# Patient Record
Sex: FEMALE | Race: WHITE | Employment: UNEMPLOYED
[De-identification: names, ages, dates, MRNs, and addresses within clinical notes are randomized per-mention and may not be internally consistent; named-entity substitution may affect disease eponyms.]

---

## 2017-01-23 ENCOUNTER — CARE COORDINATION (OUTPATIENT)
Dept: CARE COORDINATION | Facility: CLINIC | Age: 42
End: 2017-01-23

## 2017-02-04 ENCOUNTER — TELEPHONE (OUTPATIENT)
Dept: FAMILY MEDICINE CLINIC | Facility: CLINIC | Age: 42
End: 2017-02-04

## 2017-02-06 ENCOUNTER — CARE COORDINATION (OUTPATIENT)
Dept: CARE COORDINATION | Facility: CLINIC | Age: 42
End: 2017-02-06

## 2017-02-13 ENCOUNTER — CARE COORDINATION (OUTPATIENT)
Dept: CARE COORDINATION | Facility: CLINIC | Age: 42
End: 2017-02-13

## 2017-02-20 ENCOUNTER — CARE COORDINATION (OUTPATIENT)
Dept: CARE COORDINATION | Facility: CLINIC | Age: 42
End: 2017-02-20

## 2017-02-27 ENCOUNTER — CARE COORDINATION (OUTPATIENT)
Dept: CARE COORDINATION | Facility: CLINIC | Age: 42
End: 2017-02-27

## 2017-02-28 ENCOUNTER — CARE COORDINATION (OUTPATIENT)
Dept: CARE COORDINATION | Facility: CLINIC | Age: 42
End: 2017-02-28

## 2017-03-20 ENCOUNTER — CARE COORDINATION (OUTPATIENT)
Dept: CARE COORDINATION | Age: 42
End: 2017-03-20

## 2017-03-27 ENCOUNTER — CARE COORDINATION (OUTPATIENT)
Dept: CARE COORDINATION | Age: 42
End: 2017-03-27

## 2017-04-05 DIAGNOSIS — E03.9 HYPOTHYROIDISM, UNSPECIFIED TYPE: Primary | ICD-10-CM

## 2017-04-06 RX ORDER — LEVOTHYROXINE SODIUM 0.07 MG/1
75 TABLET ORAL DAILY
Qty: 30 TABLET | Refills: 1 | Status: SHIPPED | OUTPATIENT
Start: 2017-04-06 | End: 2017-06-18 | Stop reason: SDUPTHER

## 2017-04-10 ENCOUNTER — CARE COORDINATION (OUTPATIENT)
Dept: CARE COORDINATION | Age: 42
End: 2017-04-10

## 2017-04-17 ENCOUNTER — CARE COORDINATION (OUTPATIENT)
Dept: CARE COORDINATION | Age: 42
End: 2017-04-17

## 2017-05-01 ENCOUNTER — CARE COORDINATION (OUTPATIENT)
Dept: CARE COORDINATION | Age: 42
End: 2017-05-01

## 2017-05-11 DIAGNOSIS — F33.1 MODERATE EPISODE OF RECURRENT MAJOR DEPRESSIVE DISORDER (HCC): ICD-10-CM

## 2017-05-11 RX ORDER — MIRTAZAPINE 30 MG/1
30 TABLET, FILM COATED ORAL NIGHTLY
Qty: 30 TABLET | Refills: 2 | Status: SHIPPED | OUTPATIENT
Start: 2017-05-11 | End: 2017-08-09

## 2017-05-11 RX ORDER — ATORVASTATIN CALCIUM 20 MG/1
20 TABLET, FILM COATED ORAL DAILY
Qty: 30 TABLET | Refills: 2 | Status: SHIPPED | OUTPATIENT
Start: 2017-05-11 | End: 2017-08-30 | Stop reason: SDUPTHER

## 2017-05-30 ENCOUNTER — OFFICE VISIT (OUTPATIENT)
Dept: FAMILY MEDICINE CLINIC | Age: 42
End: 2017-05-30
Payer: MEDICARE

## 2017-05-30 ENCOUNTER — HOSPITAL ENCOUNTER (OUTPATIENT)
Age: 42
Setting detail: SPECIMEN
Discharge: HOME | End: 2017-05-30
Payer: MEDICARE

## 2017-05-30 VITALS
WEIGHT: 130 LBS | BODY MASS INDEX: 23.04 KG/M2 | TEMPERATURE: 97.8 F | SYSTOLIC BLOOD PRESSURE: 98 MMHG | RESPIRATION RATE: 20 BRPM | HEART RATE: 100 BPM | HEIGHT: 63 IN | DIASTOLIC BLOOD PRESSURE: 70 MMHG

## 2017-05-30 DIAGNOSIS — E03.9 HYPOTHYROIDISM, UNSPECIFIED TYPE: ICD-10-CM

## 2017-05-30 DIAGNOSIS — K76.0 FATTY LIVER: ICD-10-CM

## 2017-05-30 DIAGNOSIS — Z23 NEED FOR PNEUMOCOCCAL VACCINATION: ICD-10-CM

## 2017-05-30 DIAGNOSIS — M21.611 BILATERAL BUNIONS: ICD-10-CM

## 2017-05-30 DIAGNOSIS — M21.612 BILATERAL BUNIONS: ICD-10-CM

## 2017-05-30 DIAGNOSIS — G62.9 NEUROPATHY: ICD-10-CM

## 2017-05-30 DIAGNOSIS — I95.89 OTHER SPECIFIED HYPOTENSION: ICD-10-CM

## 2017-05-30 DIAGNOSIS — K70.31 ALCOHOLIC CIRRHOSIS OF LIVER WITH ASCITES (HCC): ICD-10-CM

## 2017-05-30 DIAGNOSIS — K76.82 HEPATIC ENCEPHALOPATHY: ICD-10-CM

## 2017-05-30 DIAGNOSIS — F33.0 MILD EPISODE OF RECURRENT MAJOR DEPRESSIVE DISORDER (HCC): ICD-10-CM

## 2017-05-30 LAB
ANION GAP SERPL CALCULATED.3IONS-SCNC: 13 MMOL/L (ref 9–17)
BUN BLDV-MCNC: 25 MG/DL (ref 6–20)
BUN/CREAT BLD: ABNORMAL (ref 9–20)
CALCIUM SERPL-MCNC: 9.2 MG/DL (ref 8.6–10.4)
CHLORIDE BLD-SCNC: 99 MMOL/L (ref 98–107)
CO2: 24 MMOL/L (ref 20–31)
CREAT SERPL-MCNC: 1.96 MG/DL (ref 0.5–0.9)
CREATININE URINE: 73.4 MG/DL (ref 28–217)
GFR AFRICAN AMERICAN: 34 ML/MIN
GFR NON-AFRICAN AMERICAN: 28 ML/MIN
GFR SERPL CREATININE-BSD FRML MDRD: ABNORMAL ML/MIN/{1.73_M2}
GFR SERPL CREATININE-BSD FRML MDRD: ABNORMAL ML/MIN/{1.73_M2}
GLUCOSE BLD-MCNC: 123 MG/DL (ref 70–99)
MICROALBUMIN/CREAT 24H UR: <12 MG/L
MICROALBUMIN/CREAT UR-RTO: 16 MCG/MG CREAT
POTASSIUM SERPL-SCNC: 3.9 MMOL/L (ref 3.7–5.3)
SODIUM BLD-SCNC: 136 MMOL/L (ref 135–144)
TSH SERPL DL<=0.05 MIU/L-ACNC: 0.86 MIU/L (ref 0.3–5)

## 2017-05-30 PROCEDURE — G0009 ADMIN PNEUMOCOCCAL VACCINE: HCPCS | Performed by: NURSE PRACTITIONER

## 2017-05-30 PROCEDURE — 99214 OFFICE O/P EST MOD 30 MIN: CPT | Performed by: NURSE PRACTITIONER

## 2017-05-30 PROCEDURE — 90670 PCV13 VACCINE IM: CPT | Performed by: NURSE PRACTITIONER

## 2017-05-30 ASSESSMENT — ENCOUNTER SYMPTOMS
RHINORRHEA: 1
WHEEZING: 0
CONSTIPATION: 0
COUGH: 0
NAUSEA: 1
SHORTNESS OF BREATH: 0
TROUBLE SWALLOWING: 0
SORE THROAT: 0
DIARRHEA: 0
EYE DISCHARGE: 0
EYE PAIN: 0
VOMITING: 1
ABDOMINAL PAIN: 0

## 2017-05-30 ASSESSMENT — PATIENT HEALTH QUESTIONNAIRE - PHQ9
SUM OF ALL RESPONSES TO PHQ QUESTIONS 1-9: 0
1. LITTLE INTEREST OR PLEASURE IN DOING THINGS: 0
2. FEELING DOWN, DEPRESSED OR HOPELESS: 0
SUM OF ALL RESPONSES TO PHQ9 QUESTIONS 1 & 2: 0

## 2017-05-31 LAB
ESTIMATED AVERAGE GLUCOSE: 180 MG/DL
HBA1C MFR BLD: 7.9 % (ref 4–6)

## 2017-06-18 DIAGNOSIS — E03.9 HYPOTHYROIDISM, UNSPECIFIED TYPE: ICD-10-CM

## 2017-06-19 ENCOUNTER — TELEPHONE (OUTPATIENT)
Dept: FAMILY MEDICINE CLINIC | Age: 42
End: 2017-06-19

## 2017-06-19 DIAGNOSIS — R79.89 ELEVATED SERUM CREATININE: ICD-10-CM

## 2017-06-19 DIAGNOSIS — R79.9 ELEVATED BUN: Primary | ICD-10-CM

## 2017-06-19 RX ORDER — MIDODRINE HYDROCHLORIDE 5 MG/1
TABLET ORAL
Qty: 240 TABLET | Refills: 1 | Status: SHIPPED | OUTPATIENT
Start: 2017-06-19 | End: 2017-08-09 | Stop reason: SDUPTHER

## 2017-06-19 RX ORDER — LEVOTHYROXINE SODIUM 0.07 MG/1
75 TABLET ORAL DAILY
Qty: 30 TABLET | Refills: 9 | Status: SHIPPED | OUTPATIENT
Start: 2017-06-19 | End: 2018-04-23 | Stop reason: DRUGHIGH

## 2017-08-09 ENCOUNTER — CARE COORDINATION (OUTPATIENT)
Dept: CARE COORDINATION | Age: 42
End: 2017-08-09

## 2017-08-09 RX ORDER — TRAZODONE HYDROCHLORIDE 100 MG/1
100 TABLET ORAL NIGHTLY
Qty: 30 TABLET | Refills: 2 | OUTPATIENT
Start: 2017-08-09 | End: 2018-04-23

## 2017-08-16 ENCOUNTER — CARE COORDINATION (OUTPATIENT)
Dept: CARE COORDINATION | Age: 42
End: 2017-08-16

## 2017-08-24 ENCOUNTER — OFFICE VISIT (OUTPATIENT)
Dept: FAMILY MEDICINE CLINIC | Age: 42
End: 2017-08-24
Payer: MEDICARE

## 2017-08-24 VITALS
OXYGEN SATURATION: 99 % | HEART RATE: 66 BPM | BODY MASS INDEX: 22.15 KG/M2 | SYSTOLIC BLOOD PRESSURE: 112 MMHG | TEMPERATURE: 97 F | HEIGHT: 63 IN | WEIGHT: 125 LBS | RESPIRATION RATE: 20 BRPM | DIASTOLIC BLOOD PRESSURE: 82 MMHG

## 2017-08-24 DIAGNOSIS — K70.31 ALCOHOLIC CIRRHOSIS OF LIVER WITH ASCITES (HCC): ICD-10-CM

## 2017-08-24 DIAGNOSIS — E11.319 DIABETIC RETINOPATHY WITHOUT MACULAR EDEMA ASSOCIATED WITH TYPE 2 DIABETES MELLITUS, UNSPECIFIED LATERALITY, UNSPECIFIED RETINOPATHY SEVERITY (HCC): ICD-10-CM

## 2017-08-24 DIAGNOSIS — E11.42 DIABETIC POLYNEUROPATHY ASSOCIATED WITH TYPE 2 DIABETES MELLITUS (HCC): ICD-10-CM

## 2017-08-24 DIAGNOSIS — E11.21 DIABETIC NEPHROPATHY ASSOCIATED WITH TYPE 2 DIABETES MELLITUS (HCC): ICD-10-CM

## 2017-08-24 DIAGNOSIS — K76.82 HEPATIC ENCEPHALOPATHY: ICD-10-CM

## 2017-08-24 DIAGNOSIS — E03.9 HYPOTHYROIDISM, UNSPECIFIED TYPE: ICD-10-CM

## 2017-08-24 DIAGNOSIS — K86.0 ALCOHOL-INDUCED CHRONIC PANCREATITIS (HCC): ICD-10-CM

## 2017-08-24 DIAGNOSIS — I95.89 OTHER SPECIFIED HYPOTENSION: ICD-10-CM

## 2017-08-24 PROCEDURE — 99214 OFFICE O/P EST MOD 30 MIN: CPT | Performed by: NURSE PRACTITIONER

## 2017-08-24 ASSESSMENT — ENCOUNTER SYMPTOMS
RHINORRHEA: 0
NAUSEA: 1
EYE DISCHARGE: 0
CONSTIPATION: 0
WHEEZING: 0
SORE THROAT: 0
TROUBLE SWALLOWING: 0
SHORTNESS OF BREATH: 0
COUGH: 0
BLOOD IN STOOL: 1
BACK PAIN: 0
DIARRHEA: 0
EYE PAIN: 0
ABDOMINAL PAIN: 0
VOMITING: 1

## 2017-08-30 ENCOUNTER — CARE COORDINATION (OUTPATIENT)
Dept: CARE COORDINATION | Age: 42
End: 2017-08-30

## 2017-08-30 RX ORDER — ATORVASTATIN CALCIUM 20 MG/1
20 TABLET, FILM COATED ORAL DAILY
Qty: 30 TABLET | Refills: 2 | Status: SHIPPED | OUTPATIENT
Start: 2017-08-30 | End: 2017-10-13 | Stop reason: SDUPTHER

## 2017-09-06 ENCOUNTER — CARE COORDINATION (OUTPATIENT)
Dept: CARE COORDINATION | Age: 42
End: 2017-09-06

## 2017-09-13 ENCOUNTER — CARE COORDINATION (OUTPATIENT)
Dept: CARE COORDINATION | Age: 42
End: 2017-09-13

## 2017-09-20 ENCOUNTER — CARE COORDINATION (OUTPATIENT)
Dept: CARE COORDINATION | Age: 42
End: 2017-09-20

## 2017-09-27 ENCOUNTER — CARE COORDINATION (OUTPATIENT)
Dept: CARE COORDINATION | Age: 42
End: 2017-09-27

## 2017-10-11 ENCOUNTER — CARE COORDINATION (OUTPATIENT)
Dept: CARE COORDINATION | Age: 42
End: 2017-10-11

## 2017-10-13 RX ORDER — ATORVASTATIN CALCIUM 20 MG/1
20 TABLET, FILM COATED ORAL DAILY
Qty: 90 TABLET | Refills: 0 | Status: SHIPPED | OUTPATIENT
Start: 2017-10-13 | End: 2018-03-16 | Stop reason: SDUPTHER

## 2017-10-13 NOTE — TELEPHONE ENCOUNTER
Uncontrolled type 2 diabetes mellitus with stage 4 chronic kidney disease, with long-term current use of insulin ([facility] [address]

## 2017-10-18 ENCOUNTER — CARE COORDINATION (OUTPATIENT)
Dept: CARE COORDINATION | Age: 42
End: 2017-10-18

## 2017-10-25 ENCOUNTER — CARE COORDINATION (OUTPATIENT)
Dept: CARE COORDINATION | Age: 42
End: 2017-10-25

## 2017-11-01 ENCOUNTER — CARE COORDINATION (OUTPATIENT)
Dept: CARE COORDINATION | Age: 42
End: 2017-11-01

## 2017-11-07 RX ORDER — PEN NEEDLE, DIABETIC 31 GX5/16"
1 NEEDLE, DISPOSABLE MISCELLANEOUS DAILY
Qty: 100 EACH | Refills: 2 | Status: SHIPPED | OUTPATIENT
Start: 2017-11-07 | End: 2019-01-22

## 2017-11-08 ENCOUNTER — CARE COORDINATION (OUTPATIENT)
Dept: CARE COORDINATION | Age: 42
End: 2017-11-08

## 2017-11-08 NOTE — CARE COORDINATION
Ambulatory Care Coordination Note  11/8/2017  CM Risk Score: No Risk Score On File  Brenda Mortality Risk Score:      ACC: Colonel Botello, RN    Summary Note: spoke with pt who said she is doing well. She said her bs have been great 110-115. She is taking 5 units of insulin and this going good. She denies any lows. She did follow up with nephrology. Will discharge from care coordination at this time. She does know how to reach rncc if she has any questions or difficulty with her bs. Care Coordination Interventions    Program Enrollment:  Rising Risk  Referral from Primary Care Provider:  No  Suggested Interventions and Community Resources  Diabetes Education:  Completed  Medi Set or Pill Pack:  Completed         Goals Addressed     None          Prior to Admission medications    Medication Sig Start Date End Date Taking? Authorizing Provider   Insulin Pen Needle (PEN NEEDLES 31GX5/16\") 31G X 8 MM MISC Inject 1 each as directed daily 11/7/17 11/7/18  Bandar Underwood CNP   atorvastatin (LIPITOR) 20 MG tablet Take 1 tablet by mouth daily 10/13/17 10/13/18  Bandar Underwood CNP   saxagliptin (ONGLYZA) 5 MG TABS tablet Take 1 tablet by mouth daily 8/30/17 8/30/18  Bandar Underwood CNP   glucose blood VI test strips (ASCENSIA AUTODISC VI;ONE TOUCH ULTRA TEST VI) strip 1 each by In Vitro route 4 times daily As needed.  8/25/17 8/25/18  Bandar Underwood CNP   traZODone (DESYREL) 100 MG tablet Take 1 tablet by mouth nightly 8/9/17 8/9/18  Bandar Underwood CNP   levothyroxine (SYNTHROID) 75 MCG tablet Take 1 tablet by mouth daily 6/19/17 6/19/18  Bandar Underwood CNP   insulin glargine (TOUJEO SOLOSTAR) 300 UNIT/ML injection pen Inject 40 units subcutaneously every morning  Patient taking differently: 5 Units nightly Taking 5 units daily 5/11/17 5/11/18 [address], CNP   Blood Glucose Monitoring Suppl CASSY Check blood sugars 2-3 times daily as needed 4/11/17 4/11/18  Bandar Underwood CNP   glucose blood VI test strips (FREESTYLE LITE) strip 1 each by Other route 3 times daily 2/6/17 2/6/18  Claire Worthington CNP   midodrine (PROAMATINE) 5 MG tablet Take 10 mg by mouth daily    Historical Provider, MD VELASCOYLE LANCETS MISC 1 each by Does not apply route daily 12/7/15   Claire Worthington CNP   Blood Gluc Meter Disp-Strips (BLOOD GLUCOSE METER DISPOSABLE) CASSY 1 each by Does not apply route 3 times daily Uses freestyle light glucometer 12/7/15 2/23/16  Claire Worthington CNP   Multiple Vitamins-Minerals (MULTIVITAMIN PO) Take  by mouth.     Historical Provider, MD       Future Appointments  Date Time Provider Eva Smyth   11/13/2017 12:10 PM Aguila Newman MD [facility] None

## 2018-02-07 NOTE — TELEPHONE ENCOUNTER
Neuropathy (HCC)     Tic disorder     Pancreas cyst     Hypothyroidism     Gastroparesis     Chronic pancreatitis (HCC)     Alcoholic cirrhosis of liver with ascites (HCC)     Hepatic encephalopathy (HCC)     Depression     Hypotension     Uncontrolled type 2 diabetes mellitus with stage 4 chronic kidney disease, with long-term current use of insulin ([facility] [address]

## 2018-03-16 NOTE — TELEPHONE ENCOUNTER
LOV 8-24-17  LR 10-13-17  F/U scheduled 5-1-18    Health Maintenance   Topic Date Due    Diabetic retinal exam  02/15/1983    Cervical cancer screen  02/15/1994    Flu vaccine (1) 09/01/2017    DTaP/Tdap/Td vaccine (1 - Tdap) 08/03/2018 (Originally 2/15/1992)    Diabetic foot exam  05/30/2018    TSH testing  05/30/2018    A1C test (Diabetic or Prediabetic)  11/07/2018    Lipid screen  11/07/2018    Potassium monitoring  11/08/2018    Creatinine monitoring  11/08/2018    Pneumococcal highest risk (3 of 3 - PPSV23) 10/28/2019    HIV screen  Completed             (applicable per patient's age: Cancer Screenings, Depression Screening, Fall Risk Screening, Immunizations)    Hemoglobin A1C (%)   Date Value   11/07/2017 6.8 (H)   05/30/2017 7.9 (H)   12/20/2016 9.7 (H)     Microalb/Crt.  Ratio (mcg/mg creat)   Date Value   05/30/2017 16     LDL Cholesterol (mg/dL)   Date Value   11/07/2017 49     LDL Calculated (mg/dL)   Date Value   07/06/2016 121   07/06/2016 121     AST (IU/L)   Date Value   11/07/2017 13     ALT (IU/L)   Date Value   11/07/2017 10     BUN (mg/dL)   Date Value   11/07/2017 13      (goal A1C is < 7)   (goal LDL is <100) need 30-50% reduction from baseline     BP Readings from Last 3 Encounters:   01/15/18 92/60   10/30/17 92/60   08/24/17 112/82    (goal /80)      All Future Testing planned in [facility]:  Lab Frequency Next Occurrence   US Renal Complete Once 07/04/5926   Basic Metabolic Panel Once 89/17/3611   CBC Auto Differential Once 04/15/2018   PTH, Intact Once 04/15/2018   Vitamin D 25 Hydroxy Once 04/15/2018   Creatinine, Random Urine Once 04/15/2018   Protein, urine, random Once 04/15/2018       Next Visit Date:  Future Appointments  Date Time Provider Eva Smyth   4/23/2018 3:10 PM Demarcus Kearns MD [facility] None   5/1/2018 3:00 PM Mark Eugene CNP [place] Diver [address] [facility]            Patient Active Problem List:     Fatty liver     Migraine without aura     Neuropathy

## 2018-03-19 RX ORDER — ATORVASTATIN CALCIUM 20 MG/1
20 TABLET, FILM COATED ORAL DAILY
Qty: 90 TABLET | Refills: 1 | Status: SHIPPED | OUTPATIENT
Start: 2018-03-19 | End: 2018-10-13 | Stop reason: SDUPTHER

## 2018-03-21 NOTE — TELEPHONE ENCOUNTER
LOV 8-24-17  LR 11-15-17     Health Maintenance   Topic Date Due    Diabetic retinal exam  02/15/1983    Cervical cancer screen  02/15/1994    Flu vaccine (1) 09/01/2017    DTaP/Tdap/Td vaccine (1 - Tdap) 08/03/2018 (Originally 2/15/1992)    Diabetic foot exam  05/30/2018    TSH testing  05/30/2018    A1C test (Diabetic or Prediabetic)  11/07/2018    Lipid screen  11/07/2018    Potassium monitoring  11/08/2018    Creatinine monitoring  11/08/2018    Pneumococcal highest risk (3 of 3 - PPSV23) 10/28/2019    HIV screen  Completed             (applicable per patient's age: Cancer Screenings, Depression Screening, Fall Risk Screening, Immunizations)    Hemoglobin A1C (%)   Date Value   11/07/2017 6.8 (H)   05/30/2017 7.9 (H)   12/20/2016 9.7 (H)     Microalb/Crt.  Ratio (mcg/mg creat)   Date Value   05/30/2017 16     LDL Cholesterol (mg/dL)   Date Value   11/07/2017 49     LDL Calculated (mg/dL)   Date Value   07/06/2016 121   07/06/2016 121     AST (IU/L)   Date Value   11/07/2017 13     ALT (IU/L)   Date Value   11/07/2017 10     BUN (mg/dL)   Date Value   11/07/2017 13      (goal A1C is < 7)   (goal LDL is <100) need 30-50% reduction from baseline     BP Readings from Last 3 Encounters:   01/15/18 92/60   10/30/17 92/60   08/24/17 112/82    (goal /80)      All Future Testing planned in [facility]:  Lab Frequency Next Occurrence   US Renal Complete Once 42/59/9662   Basic Metabolic Panel Once 60/24/0339   CBC Auto Differential Once 04/15/2018   PTH, Intact Once 04/15/2018   Vitamin D 25 Hydroxy Once 04/15/2018   Creatinine, Random Urine Once 04/15/2018   Protein, urine, random Once 04/15/2018       Next Visit Date:  Future Appointments  Date Time Provider Eva Smyth   4/23/2018 3:10 PM Guero Linton MD [facility] None   5/1/2018 3:00 PM CIARA Moore            Patient Active Problem List:     Fatty liver     Migraine without aura     Neuropathy (HCC)     Tic disorder

## 2018-03-22 RX ORDER — MIDODRINE HYDROCHLORIDE 5 MG/1
10 TABLET ORAL DAILY
Qty: 180 TABLET | Refills: 1 | Status: SHIPPED | OUTPATIENT
Start: 2018-03-22 | End: 2018-10-22 | Stop reason: SDUPTHER

## 2018-04-19 ENCOUNTER — TELEPHONE (OUTPATIENT)
Dept: FAMILY MEDICINE CLINIC | Age: 43
End: 2018-04-19

## 2018-04-19 DIAGNOSIS — E03.9 HYPOTHYROIDISM, UNSPECIFIED TYPE: ICD-10-CM

## 2018-04-23 ENCOUNTER — OFFICE VISIT (OUTPATIENT)
Dept: FAMILY MEDICINE CLINIC | Age: 43
End: 2018-04-23
Payer: MEDICARE

## 2018-04-23 VITALS
DIASTOLIC BLOOD PRESSURE: 68 MMHG | HEART RATE: 92 BPM | TEMPERATURE: 97 F | RESPIRATION RATE: 16 BRPM | SYSTOLIC BLOOD PRESSURE: 94 MMHG

## 2018-04-23 DIAGNOSIS — E03.9 HYPOTHYROIDISM, UNSPECIFIED TYPE: ICD-10-CM

## 2018-04-23 DIAGNOSIS — I95.89 OTHER SPECIFIED HYPOTENSION: ICD-10-CM

## 2018-04-23 DIAGNOSIS — B35.1 FUNGAL TOENAIL INFECTION: ICD-10-CM

## 2018-04-23 DIAGNOSIS — K86.0 ALCOHOL-INDUCED CHRONIC PANCREATITIS (HCC): ICD-10-CM

## 2018-04-23 DIAGNOSIS — Z79.4 CONTROLLED TYPE 2 DIABETES MELLITUS WITH STAGE 3 CHRONIC KIDNEY DISEASE, WITH LONG-TERM CURRENT USE OF INSULIN (HCC): Primary | ICD-10-CM

## 2018-04-23 DIAGNOSIS — F17.200 TOBACCO DEPENDENCE: ICD-10-CM

## 2018-04-23 DIAGNOSIS — G62.9 NEUROPATHY: ICD-10-CM

## 2018-04-23 DIAGNOSIS — E55.9 VITAMIN D DEFICIENCY: ICD-10-CM

## 2018-04-23 DIAGNOSIS — F33.0 MILD EPISODE OF RECURRENT MAJOR DEPRESSIVE DISORDER (HCC): ICD-10-CM

## 2018-04-23 DIAGNOSIS — G47.00 INSOMNIA, UNSPECIFIED TYPE: ICD-10-CM

## 2018-04-23 DIAGNOSIS — K70.31 ALCOHOLIC CIRRHOSIS OF LIVER WITH ASCITES (HCC): ICD-10-CM

## 2018-04-23 DIAGNOSIS — N18.30 CONTROLLED TYPE 2 DIABETES MELLITUS WITH STAGE 3 CHRONIC KIDNEY DISEASE, WITH LONG-TERM CURRENT USE OF INSULIN (HCC): Primary | ICD-10-CM

## 2018-04-23 DIAGNOSIS — E11.22 CONTROLLED TYPE 2 DIABETES MELLITUS WITH STAGE 3 CHRONIC KIDNEY DISEASE, WITH LONG-TERM CURRENT USE OF INSULIN (HCC): Primary | ICD-10-CM

## 2018-04-23 PROCEDURE — G8420 CALC BMI NORM PARAMETERS: HCPCS | Performed by: NURSE PRACTITIONER

## 2018-04-23 PROCEDURE — 3044F HG A1C LEVEL LT 7.0%: CPT | Performed by: NURSE PRACTITIONER

## 2018-04-23 PROCEDURE — 2022F DILAT RTA XM EVC RTNOPTHY: CPT | Performed by: NURSE PRACTITIONER

## 2018-04-23 PROCEDURE — 99214 OFFICE O/P EST MOD 30 MIN: CPT | Performed by: NURSE PRACTITIONER

## 2018-04-23 PROCEDURE — G8427 DOCREV CUR MEDS BY ELIG CLIN: HCPCS | Performed by: NURSE PRACTITIONER

## 2018-04-23 PROCEDURE — 4004F PT TOBACCO SCREEN RCVD TLK: CPT | Performed by: NURSE PRACTITIONER

## 2018-04-23 RX ORDER — TRAZODONE HYDROCHLORIDE 150 MG/1
150 TABLET ORAL NIGHTLY
Qty: 30 TABLET | Refills: 3 | Status: SHIPPED | OUTPATIENT
Start: 2018-04-23 | End: 2019-01-22

## 2018-04-23 RX ORDER — LEVOTHYROXINE SODIUM 0.05 MG/1
50 TABLET ORAL DAILY
Qty: 30 TABLET | Refills: 3 | Status: SHIPPED | OUTPATIENT
Start: 2018-04-23 | End: 2018-09-17 | Stop reason: SDUPTHER

## 2018-04-23 RX ORDER — ERGOCALCIFEROL 1.25 MG/1
50000 CAPSULE ORAL WEEKLY
Qty: 4 CAPSULE | Refills: 2 | Status: SHIPPED | OUTPATIENT
Start: 2018-04-23 | End: 2019-01-22 | Stop reason: ALTCHOICE

## 2018-04-23 ASSESSMENT — ENCOUNTER SYMPTOMS
WHEEZING: 0
BACK PAIN: 0
CONSTIPATION: 0
RHINORRHEA: 0
SHORTNESS OF BREATH: 0
ABDOMINAL PAIN: 0
COUGH: 0
EYE PAIN: 0
SORE THROAT: 0
EYE DISCHARGE: 0
NAUSEA: 0
VOMITING: 0
DIARRHEA: 0

## 2018-04-24 PROBLEM — N18.30 CONTROLLED TYPE 2 DIABETES MELLITUS WITH STAGE 3 CHRONIC KIDNEY DISEASE, WITH LONG-TERM CURRENT USE OF INSULIN (HCC): Status: ACTIVE | Noted: 2017-05-31

## 2018-04-24 PROBLEM — E11.22 CONTROLLED TYPE 2 DIABETES MELLITUS WITH STAGE 3 CHRONIC KIDNEY DISEASE, WITH LONG-TERM CURRENT USE OF INSULIN (HCC): Status: ACTIVE | Noted: 2017-05-31

## 2018-04-24 PROBLEM — Z79.4 CONTROLLED TYPE 2 DIABETES MELLITUS WITH STAGE 3 CHRONIC KIDNEY DISEASE, WITH LONG-TERM CURRENT USE OF INSULIN (HCC): Status: ACTIVE | Noted: 2017-05-31

## 2018-06-07 ENCOUNTER — TELEPHONE (OUTPATIENT)
Dept: FAMILY MEDICINE CLINIC | Age: 43
End: 2018-06-07

## 2018-06-26 DIAGNOSIS — E03.9 HYPOTHYROIDISM, UNSPECIFIED TYPE: ICD-10-CM

## 2018-06-26 RX ORDER — LEVOTHYROXINE SODIUM 0.05 MG/1
50 TABLET ORAL DAILY
Qty: 90 TABLET | Refills: 1 | OUTPATIENT
Start: 2018-06-26 | End: 2019-06-26

## 2018-09-17 DIAGNOSIS — E03.9 HYPOTHYROIDISM, UNSPECIFIED TYPE: ICD-10-CM

## 2018-09-17 NOTE — TELEPHONE ENCOUNTER
Migraine without aura     Neuropathy     Tic disorder     Pancreas cyst     Hypothyroidism     Gastroparesis     Chronic pancreatitis (HCC)     Alcoholic cirrhosis of liver with ascites (HCC)     Depression     Hypotension     Controlled type 2 diabetes mellitus with stage 3 chronic kidney disease, with long-term current use of insulin ([facility] [address]

## 2018-09-18 RX ORDER — LEVOTHYROXINE SODIUM 0.05 MG/1
50 TABLET ORAL DAILY
Qty: 30 TABLET | Refills: 0 | Status: SHIPPED | OUTPATIENT
Start: 2018-09-18 | End: 2018-10-22 | Stop reason: SDUPTHER

## 2018-10-15 RX ORDER — ATORVASTATIN CALCIUM 20 MG/1
20 TABLET, FILM COATED ORAL DAILY
Qty: 90 TABLET | Refills: 1 | Status: SHIPPED | OUTPATIENT
Start: 2018-10-15 | End: 2019-01-22 | Stop reason: ALTCHOICE

## 2018-10-22 DIAGNOSIS — E03.9 HYPOTHYROIDISM, UNSPECIFIED TYPE: ICD-10-CM

## 2018-10-26 RX ORDER — LEVOTHYROXINE SODIUM 0.05 MG/1
50 TABLET ORAL DAILY
Qty: 30 TABLET | Refills: 0 | Status: SHIPPED | OUTPATIENT
Start: 2018-10-26 | End: 2019-01-09 | Stop reason: SDUPTHER

## 2018-10-26 RX ORDER — MIDODRINE HYDROCHLORIDE 5 MG/1
10 TABLET ORAL DAILY
Qty: 180 TABLET | Refills: 0 | Status: SHIPPED | OUTPATIENT
Start: 2018-10-26 | End: 2019-01-22 | Stop reason: SDUPTHER

## 2018-10-31 NOTE — TELEPHONE ENCOUNTER
Creatinine Urine Ratio Once 01/10/2019   PTH, Intact Once 01/10/2019   Phosphorus Once 01/10/2019   Vitamin D 25 Hydroxy Once 01/10/2019       Next Visit Date:  Future Appointments  Date Time Provider Eva Libra   11/12/2018 2:20 PM Daniloord TENZIN Quintana - CNP [place]  [address] [facility]   1/21/2019 3:30 PM Leonidas Coyne MD [facility] [place] None            Patient Active Problem List:     Fatty liver     Migraine without aura     Neuropathy     Tic disorder     Pancreas cyst     Hypothyroidism     Gastroparesis     Chronic pancreatitis ([address]     Alcoholic cirrhosis of liver with ascites ([address]     Depression     Hypotension     Controlled type 2 diabetes mellitus with stage 3 chronic kidney disease, with long-term current use of insulin ([address]

## 2018-12-12 ENCOUNTER — TELEPHONE (OUTPATIENT)
Dept: FAMILY MEDICINE CLINIC | Age: 43
End: 2018-12-12

## 2018-12-12 DIAGNOSIS — E11.22 CONTROLLED TYPE 2 DIABETES MELLITUS WITH STAGE 3 CHRONIC KIDNEY DISEASE, WITH LONG-TERM CURRENT USE OF INSULIN (HCC): Primary | ICD-10-CM

## 2018-12-12 DIAGNOSIS — N18.30 CONTROLLED TYPE 2 DIABETES MELLITUS WITH STAGE 3 CHRONIC KIDNEY DISEASE, WITH LONG-TERM CURRENT USE OF INSULIN (HCC): Primary | ICD-10-CM

## 2018-12-12 DIAGNOSIS — K70.31 ALCOHOLIC CIRRHOSIS OF LIVER WITH ASCITES (HCC): ICD-10-CM

## 2018-12-12 DIAGNOSIS — Z79.4 CONTROLLED TYPE 2 DIABETES MELLITUS WITH STAGE 3 CHRONIC KIDNEY DISEASE, WITH LONG-TERM CURRENT USE OF INSULIN (HCC): Primary | ICD-10-CM

## 2018-12-12 DIAGNOSIS — E03.9 HYPOTHYROIDISM, UNSPECIFIED TYPE: ICD-10-CM

## 2018-12-12 LAB
AVERAGE GLUCOSE: NORMAL
HBA1C MFR BLD: 5.7 %

## 2018-12-14 LAB — DIABETIC RETINOPATHY: NEGATIVE

## 2019-01-08 LAB
CREATININE URINE: NORMAL MG/DL
MICROALBUMIN/CREAT 24H UR: 0.25 MG/G{CREAT}

## 2019-01-09 DIAGNOSIS — N18.30 CONTROLLED TYPE 2 DIABETES MELLITUS WITH STAGE 3 CHRONIC KIDNEY DISEASE, WITH LONG-TERM CURRENT USE OF INSULIN (HCC): ICD-10-CM

## 2019-01-09 DIAGNOSIS — E11.22 CONTROLLED TYPE 2 DIABETES MELLITUS WITH STAGE 3 CHRONIC KIDNEY DISEASE, WITH LONG-TERM CURRENT USE OF INSULIN (HCC): ICD-10-CM

## 2019-01-09 DIAGNOSIS — E03.9 HYPOTHYROIDISM, UNSPECIFIED TYPE: ICD-10-CM

## 2019-01-09 DIAGNOSIS — Z79.4 CONTROLLED TYPE 2 DIABETES MELLITUS WITH STAGE 3 CHRONIC KIDNEY DISEASE, WITH LONG-TERM CURRENT USE OF INSULIN (HCC): ICD-10-CM

## 2019-01-14 RX ORDER — LEVOTHYROXINE SODIUM 0.05 MG/1
50 TABLET ORAL DAILY
Qty: 30 TABLET | Refills: 0 | Status: SHIPPED | OUTPATIENT
Start: 2019-01-14 | End: 2019-01-22 | Stop reason: SDUPTHER

## 2019-01-22 ENCOUNTER — OFFICE VISIT (OUTPATIENT)
Dept: FAMILY MEDICINE CLINIC | Age: 44
End: 2019-01-22
Payer: MEDICARE

## 2019-01-22 VITALS
TEMPERATURE: 97.8 F | SYSTOLIC BLOOD PRESSURE: 104 MMHG | WEIGHT: 110 LBS | DIASTOLIC BLOOD PRESSURE: 72 MMHG | BODY MASS INDEX: 19.49 KG/M2 | HEART RATE: 88 BPM | RESPIRATION RATE: 14 BRPM

## 2019-01-22 DIAGNOSIS — N18.30 CONTROLLED TYPE 2 DIABETES MELLITUS WITH STAGE 3 CHRONIC KIDNEY DISEASE, WITH LONG-TERM CURRENT USE OF INSULIN (HCC): Primary | ICD-10-CM

## 2019-01-22 DIAGNOSIS — E03.9 HYPOTHYROIDISM, UNSPECIFIED TYPE: ICD-10-CM

## 2019-01-22 DIAGNOSIS — E11.22 CONTROLLED TYPE 2 DIABETES MELLITUS WITH STAGE 3 CHRONIC KIDNEY DISEASE, WITH LONG-TERM CURRENT USE OF INSULIN (HCC): Primary | ICD-10-CM

## 2019-01-22 DIAGNOSIS — K70.31 ALCOHOLIC CIRRHOSIS OF LIVER WITH ASCITES (HCC): ICD-10-CM

## 2019-01-22 DIAGNOSIS — I95.89 OTHER SPECIFIED HYPOTENSION: ICD-10-CM

## 2019-01-22 DIAGNOSIS — K86.0 ALCOHOL-INDUCED CHRONIC PANCREATITIS (HCC): ICD-10-CM

## 2019-01-22 DIAGNOSIS — Z79.4 CONTROLLED TYPE 2 DIABETES MELLITUS WITH STAGE 3 CHRONIC KIDNEY DISEASE, WITH LONG-TERM CURRENT USE OF INSULIN (HCC): Primary | ICD-10-CM

## 2019-01-22 DIAGNOSIS — G62.9 NEUROPATHY: ICD-10-CM

## 2019-01-22 PROCEDURE — 99214 OFFICE O/P EST MOD 30 MIN: CPT | Performed by: NURSE PRACTITIONER

## 2019-01-22 PROCEDURE — G8484 FLU IMMUNIZE NO ADMIN: HCPCS | Performed by: NURSE PRACTITIONER

## 2019-01-22 PROCEDURE — 3044F HG A1C LEVEL LT 7.0%: CPT | Performed by: NURSE PRACTITIONER

## 2019-01-22 PROCEDURE — G8420 CALC BMI NORM PARAMETERS: HCPCS | Performed by: NURSE PRACTITIONER

## 2019-01-22 PROCEDURE — G8427 DOCREV CUR MEDS BY ELIG CLIN: HCPCS | Performed by: NURSE PRACTITIONER

## 2019-01-22 PROCEDURE — 4004F PT TOBACCO SCREEN RCVD TLK: CPT | Performed by: NURSE PRACTITIONER

## 2019-01-22 PROCEDURE — 2022F DILAT RTA XM EVC RTNOPTHY: CPT | Performed by: NURSE PRACTITIONER

## 2019-01-22 RX ORDER — MIDODRINE HYDROCHLORIDE 5 MG/1
10 TABLET ORAL DAILY
Qty: 180 TABLET | Refills: 1 | Status: SHIPPED | OUTPATIENT
Start: 2019-01-22 | End: 2020-02-17

## 2019-01-22 RX ORDER — LEVOTHYROXINE SODIUM 0.05 MG/1
50 TABLET ORAL DAILY
Qty: 90 TABLET | Refills: 3 | Status: SHIPPED | OUTPATIENT
Start: 2019-01-22 | End: 2020-06-03

## 2019-01-22 ASSESSMENT — ENCOUNTER SYMPTOMS
ABDOMINAL PAIN: 0
WHEEZING: 0
NAUSEA: 1
COUGH: 0
DIARRHEA: 0
SORE THROAT: 0
VOMITING: 0
BACK PAIN: 0
SHORTNESS OF BREATH: 0
EYE DISCHARGE: 0
RHINORRHEA: 0
EYE PAIN: 0
CONSTIPATION: 0

## 2019-01-22 ASSESSMENT — PATIENT HEALTH QUESTIONNAIRE - PHQ9
2. FEELING DOWN, DEPRESSED OR HOPELESS: 0
SUM OF ALL RESPONSES TO PHQ9 QUESTIONS 1 & 2: 0
SUM OF ALL RESPONSES TO PHQ QUESTIONS 1-9: 0
SUM OF ALL RESPONSES TO PHQ QUESTIONS 1-9: 0
1. LITTLE INTEREST OR PLEASURE IN DOING THINGS: 0

## 2020-02-14 ENCOUNTER — HOSPITAL ENCOUNTER (OUTPATIENT)
Age: 45
Discharge: HOME | End: 2020-02-14
Payer: MEDICARE

## 2020-02-14 LAB
-: NORMAL
ABSOLUTE EOS #: 0.32 K/UL (ref 0–0.44)
ABSOLUTE IMMATURE GRANULOCYTE: <0.03 K/UL (ref 0–0.3)
ABSOLUTE LYMPH #: 2.44 K/UL (ref 1.1–3.7)
ABSOLUTE MONO #: 0.46 K/UL (ref 0.1–1.2)
AMORPHOUS: NORMAL
ANION GAP SERPL CALCULATED.3IONS-SCNC: 15 MMOL/L (ref 9–17)
BACTERIA: NORMAL
BASOPHILS # BLD: 1 % (ref 0–2)
BASOPHILS ABSOLUTE: 0.05 K/UL (ref 0–0.2)
BILIRUBIN URINE: NEGATIVE
BUN BLDV-MCNC: 29 MG/DL (ref 6–20)
BUN/CREAT BLD: ABNORMAL (ref 9–20)
CALCIUM SERPL-MCNC: 9.6 MG/DL (ref 8.6–10.4)
CASTS UA: NORMAL /LPF (ref 0–8)
CHLORIDE BLD-SCNC: 91 MMOL/L (ref 98–107)
CO2: 25 MMOL/L (ref 20–31)
COLOR: YELLOW
COMMENT UA: ABNORMAL
CREAT SERPL-MCNC: 1.48 MG/DL (ref 0.5–0.9)
CRYSTALS, UA: NORMAL /HPF
DIFFERENTIAL TYPE: ABNORMAL
EOSINOPHILS RELATIVE PERCENT: 5 % (ref 1–4)
EPITHELIAL CELLS UA: NORMAL /HPF (ref 0–5)
ESTIMATED AVERAGE GLUCOSE: 143 MG/DL
GFR AFRICAN AMERICAN: 46 ML/MIN
GFR NON-AFRICAN AMERICAN: 38 ML/MIN
GFR SERPL CREATININE-BSD FRML MDRD: ABNORMAL ML/MIN/{1.73_M2}
GFR SERPL CREATININE-BSD FRML MDRD: ABNORMAL ML/MIN/{1.73_M2}
GLUCOSE BLD-MCNC: 103 MG/DL (ref 70–99)
GLUCOSE URINE: NEGATIVE
HBA1C MFR BLD: 6.6 % (ref 4–6)
HCT VFR BLD CALC: 36.7 % (ref 36.3–47.1)
HEMOGLOBIN: 11.1 G/DL (ref 11.9–15.1)
IMMATURE GRANULOCYTES: 0 %
INR BLD: 1
KETONES, URINE: NEGATIVE
LEUKOCYTE ESTERASE, URINE: ABNORMAL
LYMPHOCYTES # BLD: 40 % (ref 24–43)
MCH RBC QN AUTO: 28.2 PG (ref 25.2–33.5)
MCHC RBC AUTO-ENTMCNC: 30.2 G/DL (ref 28.4–34.8)
MCV RBC AUTO: 93.4 FL (ref 82.6–102.9)
MONOCYTES # BLD: 8 % (ref 3–12)
MRSA, DNA, NASAL: NORMAL
MUCUS: NORMAL
NITRITE, URINE: NEGATIVE
NRBC AUTOMATED: 0 PER 100 WBC
OTHER OBSERVATIONS UA: NORMAL
PARTIAL THROMBOPLASTIN TIME: 27.2 SEC (ref 23–31)
PDW BLD-RTO: 14.1 % (ref 11.8–14.4)
PH UA: 6 (ref 5–8)
PLATELET # BLD: 130 K/UL (ref 138–453)
PLATELET ESTIMATE: ABNORMAL
PMV BLD AUTO: 12.5 FL (ref 8.1–13.5)
POTASSIUM SERPL-SCNC: 3.5 MMOL/L (ref 3.7–5.3)
PROTEIN UA: NEGATIVE
PROTHROMBIN TIME: 10.1 SEC (ref 9.7–11.6)
RBC # BLD: 3.93 M/UL (ref 3.95–5.11)
RBC # BLD: ABNORMAL 10*6/UL
RBC UA: NORMAL /HPF (ref 0–4)
RENAL EPITHELIAL, UA: NORMAL /HPF
SEG NEUTROPHILS: 46 % (ref 36–65)
SEGMENTED NEUTROPHILS ABSOLUTE COUNT: 2.75 K/UL (ref 1.5–8.1)
SODIUM BLD-SCNC: 131 MMOL/L (ref 135–144)
SPECIFIC GRAVITY UA: 1.01 (ref 1–1.03)
SPECIMEN DESCRIPTION: NORMAL
TRICHOMONAS: NORMAL
TURBIDITY: CLEAR
URINE HGB: NEGATIVE
UROBILINOGEN, URINE: NORMAL
WBC # BLD: 6 K/UL (ref 3.5–11.3)
WBC # BLD: ABNORMAL 10*3/UL
WBC UA: NORMAL /HPF (ref 0–5)
YEAST: NORMAL

## 2020-02-14 PROCEDURE — 81001 URINALYSIS AUTO W/SCOPE: CPT

## 2020-02-14 PROCEDURE — 83036 HEMOGLOBIN GLYCOSYLATED A1C: CPT

## 2020-02-14 PROCEDURE — 80048 BASIC METABOLIC PNL TOTAL CA: CPT

## 2020-02-14 PROCEDURE — 36415 COLL VENOUS BLD VENIPUNCTURE: CPT

## 2020-02-14 PROCEDURE — 85025 COMPLETE CBC W/AUTO DIFF WBC: CPT

## 2020-02-14 PROCEDURE — 85610 PROTHROMBIN TIME: CPT

## 2020-02-14 PROCEDURE — 87641 MR-STAPH DNA AMP PROBE: CPT

## 2020-02-14 PROCEDURE — 85730 THROMBOPLASTIN TIME PARTIAL: CPT

## 2020-02-17 RX ORDER — MIDODRINE HYDROCHLORIDE 5 MG/1
5 TABLET ORAL DAILY
Qty: 180 TABLET | Refills: 0 | Status: SHIPPED | OUTPATIENT
Start: 2020-02-17 | End: 2020-07-29

## 2020-02-17 NOTE — TELEPHONE ENCOUNTER
Last OARRS Review-0  Last Office Visit-01/22/2020  Return To Office-6 months   Next Appointment Date- 0  Last Refill Date- 01/22/2020 / 180 tabs   Number of Refills Given- 1  Labs associated with Medication done-0      Health Maintenance   Topic Date Due    Hepatitis A vaccine (1 of 2 - Risk 2-dose series) 02/15/1974    DTaP/Tdap/Td vaccine (1 - Tdap) 02/15/1984    Hepatitis B vaccine (1 of 3 - Risk 3-dose series) 02/15/1992    Cervical cancer screen  02/15/1994    Annual Wellness Visit (AWV)  05/29/2019    Flu vaccine (1) 09/01/2019    Diabetic microalbuminuria test  01/19/2020    TSH testing  01/19/2020    Diabetic foot exam  01/22/2020    Lipid screen  01/19/2020    Diabetic retinal exam  12/14/2020    A1C test (Diabetic or Prediabetic)  02/14/2021    Potassium monitoring  02/14/2021    Creatinine monitoring  02/14/2021    Shingles Vaccine (1 of 2) 02/15/2023    Pneumococcal 0-64 years Vaccine  Completed    HIV screen  Completed    Hib vaccine  Aged Out    Meningococcal (ACWY) vaccine  Aged Out             (applicable per patient's age: Cancer Screenings, Depression Screening, Fall Risk Screening, Immunizations)    Hemoglobin A1C (%)   Date Value   02/14/2020 6.6 (H)   01/19/2019 6.0   12/12/2018 5.7     Microalb/Crt.  Ratio (mcg/mg creat)   Date Value   05/30/2017 16     Microalbumin, Random Urine (mg/dL)   Date Value   01/19/2019 <1.0     LDL Cholesterol (mg/dL)   Date Value   01/19/2019 79     LDL Calculated (mg/dL)   Date Value   07/06/2016 121   07/06/2016 121     AST (IU/L)   Date Value   01/19/2019 18     ALT (IU/L)   Date Value   01/19/2019 17     BUN (mg/dL)   Date Value   02/14/2020 29 (H)      (goal A1C is < 7)   (goal LDL is <100) need 30-50% reduction from baseline     BP Readings from Last 3 Encounters:   01/22/19 104/72   01/21/19 100/60   04/23/18 102/64    (goal /80)      All Future Testing planned in [facility]:  Lab Frequency Next Occurrence       Next Visit Date:  No future appointments.          Patient Active Problem List:     Fatty liver     Neuropathy     Pancreas cyst     Hypothyroidism     Gastroparesis     Chronic pancreatitis (HCC)     Alcoholic cirrhosis of liver with ascites ([address]     Hypotension     Controlled type 2 diabetes mellitus with stage 3 chronic kidney disease, with long-term current use of insulin ([address]

## 2020-03-10 ENCOUNTER — TELEPHONE (OUTPATIENT)
Dept: FAMILY MEDICINE CLINIC | Age: 45
End: 2020-03-10

## 2020-06-29 ENCOUNTER — OFFICE VISIT (OUTPATIENT)
Dept: FAMILY MEDICINE CLINIC | Age: 45
End: 2020-06-29
Payer: MEDICARE

## 2020-06-29 ENCOUNTER — HOSPITAL ENCOUNTER (OUTPATIENT)
Age: 45
Setting detail: SPECIMEN
Discharge: HOME | End: 2020-06-29
Payer: MEDICARE

## 2020-06-29 ENCOUNTER — APPOINTMENT (OUTPATIENT)
Dept: ULTRASOUND IMAGING | Age: 45
DRG: 683 | End: 2020-06-29
Payer: MEDICARE

## 2020-06-29 ENCOUNTER — HOSPITAL ENCOUNTER (INPATIENT)
Age: 45
LOS: 2 days | Discharge: HOME | DRG: 683 | End: 2020-07-02
Attending: EMERGENCY MEDICINE | Admitting: INTERNAL MEDICINE
Payer: MEDICARE

## 2020-06-29 VITALS
HEIGHT: 63 IN | RESPIRATION RATE: 16 BRPM | OXYGEN SATURATION: 96 % | HEART RATE: 84 BPM | DIASTOLIC BLOOD PRESSURE: 68 MMHG | SYSTOLIC BLOOD PRESSURE: 137 MMHG | WEIGHT: 102 LBS | BODY MASS INDEX: 18.07 KG/M2 | TEMPERATURE: 98.4 F

## 2020-06-29 PROBLEM — D64.9 ANEMIA: Status: ACTIVE | Noted: 2020-06-29

## 2020-06-29 PROBLEM — N17.9 ACUTE RENAL FAILURE SYNDROME (HCC): Status: ACTIVE | Noted: 2020-06-29

## 2020-06-29 PROBLEM — Z79.4 LONG TERM (CURRENT) USE OF INSULIN (HCC): Status: ACTIVE | Noted: 2017-08-28

## 2020-06-29 PROBLEM — E11.65 TYPE 2 DIABETES MELLITUS WITH HYPERGLYCEMIA (HCC): Status: ACTIVE | Noted: 2017-08-28

## 2020-06-29 PROBLEM — Z45.1 ENCOUNTER FOR ADJUSTMENT AND MANAGEMENT OF INFUSION PUMP: Status: ACTIVE | Noted: 2018-03-20

## 2020-06-29 PROBLEM — N18.30 CHRONIC RENAL INSUFFICIENCY, STAGE III (MODERATE) (HCC): Status: ACTIVE | Noted: 2017-08-28

## 2020-06-29 PROBLEM — R60.0 EDEMA OF LOWER EXTREMITY: Status: ACTIVE | Noted: 2020-06-29

## 2020-06-29 PROBLEM — E11.9 TYPE 2 DIABETES MELLITUS WITHOUT COMPLICATIONS (HCC): Status: ACTIVE | Noted: 2017-09-01

## 2020-06-29 PROBLEM — E83.52 HYPERCALCEMIA: Status: ACTIVE | Noted: 2020-06-29

## 2020-06-29 PROBLEM — N17.9 AKI (ACUTE KIDNEY INJURY) (HCC): Status: ACTIVE | Noted: 2020-06-29

## 2020-06-29 PROBLEM — K86.0 ALCOHOL-INDUCED CHRONIC PANCREATITIS (HCC): Status: ACTIVE | Noted: 2020-06-29

## 2020-06-29 PROBLEM — K74.60 CIRRHOSIS OF LIVER (HCC): Status: ACTIVE | Noted: 2020-06-29

## 2020-06-29 LAB
ABSOLUTE EOS #: 0.37 K/UL (ref 0–0.44)
ABSOLUTE IMMATURE GRANULOCYTE: <0.03 K/UL (ref 0–0.3)
ABSOLUTE LYMPH #: 2.77 K/UL (ref 1.1–3.7)
ABSOLUTE MONO #: 0.36 K/UL (ref 0.1–1.2)
ALBUMIN SERPL-MCNC: 4.5 G/DL (ref 3.5–5.2)
ALBUMIN/GLOBULIN RATIO: 1.3 (ref 1–2.5)
ALP BLD-CCNC: 99 U/L (ref 35–104)
ALT SERPL-CCNC: 10 U/L (ref 5–33)
ANION GAP SERPL CALCULATED.3IONS-SCNC: 16 MMOL/L (ref 9–17)
ANION GAP SERPL CALCULATED.3IONS-SCNC: 20 MMOL/L (ref 9–17)
AST SERPL-CCNC: 10 U/L
BASOPHILS # BLD: 1 % (ref 0–2)
BASOPHILS ABSOLUTE: 0.05 K/UL (ref 0–0.2)
BILIRUB SERPL-MCNC: 0.25 MG/DL (ref 0.3–1.2)
BUN BLDV-MCNC: 56 MG/DL (ref 6–20)
BUN BLDV-MCNC: 58 MG/DL (ref 6–20)
BUN/CREAT BLD: ABNORMAL (ref 9–20)
BUN/CREAT BLD: ABNORMAL (ref 9–20)
CALCIUM SERPL-MCNC: 11.6 MG/DL (ref 8.6–10.4)
CALCIUM SERPL-MCNC: 12.2 MG/DL (ref 8.6–10.4)
CHLORIDE BLD-SCNC: 96 MMOL/L (ref 98–107)
CHLORIDE BLD-SCNC: 98 MMOL/L (ref 98–107)
CHOLESTEROL/HDL RATIO: 3.5
CHOLESTEROL: 184 MG/DL
CO2: 20 MMOL/L (ref 20–31)
CO2: 21 MMOL/L (ref 20–31)
CREAT SERPL-MCNC: 2.96 MG/DL (ref 0.5–0.9)
CREAT SERPL-MCNC: 3.12 MG/DL (ref 0.5–0.9)
CREATININE URINE: 81.3 MG/DL (ref 28–217)
DIFFERENTIAL TYPE: NORMAL
EOSINOPHILS RELATIVE PERCENT: 4 % (ref 1–4)
ESTIMATED AVERAGE GLUCOSE: 197 MG/DL
GFR AFRICAN AMERICAN: 19 ML/MIN
GFR AFRICAN AMERICAN: 21 ML/MIN
GFR NON-AFRICAN AMERICAN: 16 ML/MIN
GFR NON-AFRICAN AMERICAN: 17 ML/MIN
GFR SERPL CREATININE-BSD FRML MDRD: ABNORMAL ML/MIN/{1.73_M2}
GLUCOSE BLD-MCNC: 107 MG/DL (ref 70–99)
GLUCOSE BLD-MCNC: 146 MG/DL (ref 65–105)
GLUCOSE BLD-MCNC: 155 MG/DL (ref 70–99)
HBA1C MFR BLD: 8.5 % (ref 4–6)
HCT VFR BLD CALC: 40.5 % (ref 36.3–47.1)
HDLC SERPL-MCNC: 52 MG/DL
HEMOGLOBIN: 12.5 G/DL (ref 11.9–15.1)
IMMATURE GRANULOCYTES: 0 %
LDL CHOLESTEROL: 106 MG/DL (ref 0–130)
LYMPHOCYTES # BLD: 29 % (ref 24–43)
MCH RBC QN AUTO: 27.8 PG (ref 25.2–33.5)
MCHC RBC AUTO-ENTMCNC: 30.9 G/DL (ref 28.4–34.8)
MCV RBC AUTO: 90.2 FL (ref 82.6–102.9)
MICROALBUMIN/CREAT 24H UR: <12 MG/L
MICROALBUMIN/CREAT UR-RTO: NORMAL MCG/MG CREAT
MONOCYTES # BLD: 4 % (ref 3–12)
NRBC AUTOMATED: 0 PER 100 WBC
PDW BLD-RTO: 12.8 % (ref 11.8–14.4)
PLATELET # BLD: 191 K/UL (ref 138–453)
PLATELET ESTIMATE: NORMAL
PMV BLD AUTO: 11.5 FL (ref 8.1–13.5)
POTASSIUM SERPL-SCNC: 4.7 MMOL/L (ref 3.7–5.3)
POTASSIUM SERPL-SCNC: 4.8 MMOL/L (ref 3.7–5.3)
RBC # BLD: 4.49 M/UL (ref 3.95–5.11)
RBC # BLD: NORMAL 10*6/UL
SEG NEUTROPHILS: 62 % (ref 36–65)
SEGMENTED NEUTROPHILS ABSOLUTE COUNT: 6.15 K/UL (ref 1.5–8.1)
SODIUM BLD-SCNC: 134 MMOL/L (ref 135–144)
SODIUM BLD-SCNC: 137 MMOL/L (ref 135–144)
TOTAL PROTEIN: 8.1 G/DL (ref 6.4–8.3)
TRIGL SERPL-MCNC: 129 MG/DL
TSH SERPL DL<=0.05 MIU/L-ACNC: 1.81 MIU/L (ref 0.3–5)
VLDLC SERPL CALC-MCNC: NORMAL MG/DL (ref 1–30)
WBC # BLD: 9.7 K/UL (ref 3.5–11.3)
WBC # BLD: NORMAL 10*3/UL

## 2020-06-29 PROCEDURE — 6360000002 HC RX W HCPCS: Performed by: EMERGENCY MEDICINE

## 2020-06-29 PROCEDURE — 76775 US EXAM ABDO BACK WALL LIM: CPT

## 2020-06-29 PROCEDURE — G8427 DOCREV CUR MEDS BY ELIG CLIN: HCPCS | Performed by: NURSE PRACTITIONER

## 2020-06-29 PROCEDURE — 85025 COMPLETE CBC W/AUTO DIFF WBC: CPT

## 2020-06-29 PROCEDURE — 81001 URINALYSIS AUTO W/SCOPE: CPT

## 2020-06-29 PROCEDURE — 4004F PT TOBACCO SCREEN RCVD TLK: CPT | Performed by: NURSE PRACTITIONER

## 2020-06-29 PROCEDURE — 82947 ASSAY GLUCOSE BLOOD QUANT: CPT

## 2020-06-29 PROCEDURE — 96372 THER/PROPH/DIAG INJ SC/IM: CPT

## 2020-06-29 PROCEDURE — 80048 BASIC METABOLIC PNL TOTAL CA: CPT

## 2020-06-29 PROCEDURE — G8419 CALC BMI OUT NRM PARAM NOF/U: HCPCS | Performed by: NURSE PRACTITIONER

## 2020-06-29 PROCEDURE — 2580000003 HC RX 258: Performed by: NURSE PRACTITIONER

## 2020-06-29 PROCEDURE — 99222 1ST HOSP IP/OBS MODERATE 55: CPT | Performed by: NURSE PRACTITIONER

## 2020-06-29 PROCEDURE — 84156 ASSAY OF PROTEIN URINE: CPT

## 2020-06-29 PROCEDURE — 6370000000 HC RX 637 (ALT 250 FOR IP): Performed by: STUDENT IN AN ORGANIZED HEALTH CARE EDUCATION/TRAINING PROGRAM

## 2020-06-29 PROCEDURE — 85652 RBC SED RATE AUTOMATED: CPT

## 2020-06-29 PROCEDURE — G0378 HOSPITAL OBSERVATION PER HR: HCPCS

## 2020-06-29 PROCEDURE — 99284 EMERGENCY DEPT VISIT MOD MDM: CPT

## 2020-06-29 PROCEDURE — 84300 ASSAY OF URINE SODIUM: CPT

## 2020-06-29 PROCEDURE — 6370000000 HC RX 637 (ALT 250 FOR IP): Performed by: NURSE PRACTITIONER

## 2020-06-29 PROCEDURE — 99213 OFFICE O/P EST LOW 20 MIN: CPT | Performed by: NURSE PRACTITIONER

## 2020-06-29 PROCEDURE — 86140 C-REACTIVE PROTEIN: CPT

## 2020-06-29 PROCEDURE — 2022F DILAT RTA XM EVC RTNOPTHY: CPT | Performed by: NURSE PRACTITIONER

## 2020-06-29 PROCEDURE — 3044F HG A1C LEVEL LT 7.0%: CPT | Performed by: NURSE PRACTITIONER

## 2020-06-29 PROCEDURE — 2580000003 HC RX 258: Performed by: STUDENT IN AN ORGANIZED HEALTH CARE EDUCATION/TRAINING PROGRAM

## 2020-06-29 RX ORDER — PROMETHAZINE HYDROCHLORIDE 25 MG/1
12.5 TABLET ORAL EVERY 6 HOURS PRN
Status: DISCONTINUED | OUTPATIENT
Start: 2020-06-29 | End: 2020-07-02 | Stop reason: HOSPADM

## 2020-06-29 RX ORDER — METHYLPREDNISOLONE 4 MG/1
TABLET ORAL
Qty: 1 KIT | Refills: 0 | Status: SHIPPED | OUTPATIENT
Start: 2020-06-29 | End: 2020-07-05

## 2020-06-29 RX ORDER — HEPARIN SODIUM 5000 [USP'U]/ML
5000 INJECTION, SOLUTION INTRAVENOUS; SUBCUTANEOUS EVERY 8 HOURS SCHEDULED
Status: DISCONTINUED | OUTPATIENT
Start: 2020-06-29 | End: 2020-07-02 | Stop reason: HOSPADM

## 2020-06-29 RX ORDER — TIZANIDINE 2 MG/1
2 TABLET ORAL EVERY 8 HOURS PRN
Qty: 60 TABLET | Refills: 0 | Status: SHIPPED | OUTPATIENT
Start: 2020-06-29 | End: 2020-08-24

## 2020-06-29 RX ORDER — LIDOCAINE 4 G/G
1 PATCH TOPICAL DAILY
Status: DISCONTINUED | OUTPATIENT
Start: 2020-06-29 | End: 2020-06-30

## 2020-06-29 RX ORDER — CYCLOBENZAPRINE HCL 10 MG
10 TABLET ORAL ONCE
Status: DISCONTINUED | OUTPATIENT
Start: 2020-06-29 | End: 2020-06-29

## 2020-06-29 RX ORDER — ACETAMINOPHEN 325 MG/1
650 TABLET ORAL EVERY 6 HOURS PRN
Status: DISCONTINUED | OUTPATIENT
Start: 2020-06-29 | End: 2020-07-02 | Stop reason: HOSPADM

## 2020-06-29 RX ORDER — NICOTINE 21 MG/24HR
1 PATCH, TRANSDERMAL 24 HOURS TRANSDERMAL DAILY PRN
Status: DISCONTINUED | OUTPATIENT
Start: 2020-06-29 | End: 2020-07-02 | Stop reason: HOSPADM

## 2020-06-29 RX ORDER — 0.9 % SODIUM CHLORIDE 0.9 %
500 INTRAVENOUS SOLUTION INTRAVENOUS ONCE
Status: COMPLETED | OUTPATIENT
Start: 2020-06-29 | End: 2020-06-29

## 2020-06-29 RX ORDER — LIDOCAINE 4 G/G
1 PATCH TOPICAL DAILY
Status: DISCONTINUED | OUTPATIENT
Start: 2020-06-30 | End: 2020-06-29 | Stop reason: SDUPTHER

## 2020-06-29 RX ORDER — GABAPENTIN 100 MG/1
100 CAPSULE ORAL ONCE
Status: DISCONTINUED | OUTPATIENT
Start: 2020-06-29 | End: 2020-06-29

## 2020-06-29 RX ORDER — ORPHENADRINE CITRATE 30 MG/ML
60 INJECTION INTRAMUSCULAR; INTRAVENOUS ONCE
Status: COMPLETED | OUTPATIENT
Start: 2020-06-29 | End: 2020-06-29

## 2020-06-29 RX ORDER — SODIUM CHLORIDE 0.9 % (FLUSH) 0.9 %
10 SYRINGE (ML) INJECTION PRN
Status: DISCONTINUED | OUTPATIENT
Start: 2020-06-29 | End: 2020-07-02 | Stop reason: HOSPADM

## 2020-06-29 RX ORDER — ACETAMINOPHEN 500 MG
1000 TABLET ORAL ONCE
Status: COMPLETED | OUTPATIENT
Start: 2020-06-29 | End: 2020-06-29

## 2020-06-29 RX ORDER — CYCLOBENZAPRINE HCL 10 MG
10 TABLET ORAL 3 TIMES DAILY PRN
Status: DISCONTINUED | OUTPATIENT
Start: 2020-06-29 | End: 2020-06-30

## 2020-06-29 RX ORDER — DEXTROSE MONOHYDRATE 50 MG/ML
100 INJECTION, SOLUTION INTRAVENOUS PRN
Status: DISCONTINUED | OUTPATIENT
Start: 2020-06-29 | End: 2020-07-02 | Stop reason: HOSPADM

## 2020-06-29 RX ORDER — NICOTINE POLACRILEX 4 MG
15 LOZENGE BUCCAL PRN
Status: DISCONTINUED | OUTPATIENT
Start: 2020-06-29 | End: 2020-07-02 | Stop reason: HOSPADM

## 2020-06-29 RX ORDER — SODIUM CHLORIDE 9 MG/ML
INJECTION, SOLUTION INTRAVENOUS CONTINUOUS
Status: DISCONTINUED | OUTPATIENT
Start: 2020-06-29 | End: 2020-06-30

## 2020-06-29 RX ORDER — ACETAMINOPHEN AND CODEINE PHOSPHATE 300; 30 MG/1; MG/1
1 TABLET ORAL EVERY 4 HOURS PRN
Qty: 40 TABLET | Refills: 0 | Status: SHIPPED | OUTPATIENT
Start: 2020-06-29 | End: 2020-07-06

## 2020-06-29 RX ORDER — DEXTROSE MONOHYDRATE 25 G/50ML
12.5 INJECTION, SOLUTION INTRAVENOUS PRN
Status: DISCONTINUED | OUTPATIENT
Start: 2020-06-29 | End: 2020-07-02 | Stop reason: HOSPADM

## 2020-06-29 RX ORDER — ACETAMINOPHEN 650 MG/1
650 SUPPOSITORY RECTAL EVERY 6 HOURS PRN
Status: DISCONTINUED | OUTPATIENT
Start: 2020-06-29 | End: 2020-07-02 | Stop reason: HOSPADM

## 2020-06-29 RX ORDER — SODIUM POLYSTYRENE SULFONATE 15 G/60ML
15 SUSPENSION ORAL; RECTAL
Status: ACTIVE | OUTPATIENT
Start: 2020-06-29 | End: 2020-06-29

## 2020-06-29 RX ORDER — LEVOTHYROXINE SODIUM 0.05 MG/1
50 TABLET ORAL DAILY
Status: DISCONTINUED | OUTPATIENT
Start: 2020-06-30 | End: 2020-06-30

## 2020-06-29 RX ORDER — SODIUM CHLORIDE 0.9 % (FLUSH) 0.9 %
10 SYRINGE (ML) INJECTION EVERY 12 HOURS SCHEDULED
Status: DISCONTINUED | OUTPATIENT
Start: 2020-06-29 | End: 2020-07-02 | Stop reason: HOSPADM

## 2020-06-29 RX ORDER — SODIUM POLYSTYRENE SULFONATE 15 G/60ML
30 SUSPENSION ORAL; RECTAL
Status: ACTIVE | OUTPATIENT
Start: 2020-06-29 | End: 2020-06-29

## 2020-06-29 RX ORDER — MIDODRINE HYDROCHLORIDE 5 MG/1
5 TABLET ORAL DAILY
Status: DISCONTINUED | OUTPATIENT
Start: 2020-06-29 | End: 2020-06-30

## 2020-06-29 RX ORDER — ONDANSETRON 2 MG/ML
4 INJECTION INTRAMUSCULAR; INTRAVENOUS EVERY 6 HOURS PRN
Status: DISCONTINUED | OUTPATIENT
Start: 2020-06-29 | End: 2020-07-02 | Stop reason: HOSPADM

## 2020-06-29 RX ADMIN — SODIUM CHLORIDE 500 ML: 9 INJECTION, SOLUTION INTRAVENOUS at 21:01

## 2020-06-29 RX ADMIN — ORPHENADRINE CITRATE 60 MG: 30 INJECTION INTRAMUSCULAR; INTRAVENOUS at 21:47

## 2020-06-29 RX ADMIN — SODIUM CHLORIDE: 9 INJECTION, SOLUTION INTRAVENOUS at 23:37

## 2020-06-29 RX ADMIN — ACETAMINOPHEN 1000 MG: 500 TABLET ORAL at 21:01

## 2020-06-29 RX ADMIN — CYCLOBENZAPRINE 10 MG: 10 TABLET, FILM COATED ORAL at 23:36

## 2020-06-29 SDOH — ECONOMIC STABILITY: INCOME INSECURITY: HOW HARD IS IT FOR YOU TO PAY FOR THE VERY BASICS LIKE FOOD, HOUSING, MEDICAL CARE, AND HEATING?: NOT HARD AT ALL

## 2020-06-29 SDOH — ECONOMIC STABILITY: FOOD INSECURITY: WITHIN THE PAST 12 MONTHS, THE FOOD YOU BOUGHT JUST DIDN'T LAST AND YOU DIDN'T HAVE MONEY TO GET MORE.: NEVER TRUE

## 2020-06-29 SDOH — ECONOMIC STABILITY: TRANSPORTATION INSECURITY
IN THE PAST 12 MONTHS, HAS THE LACK OF TRANSPORTATION KEPT YOU FROM MEDICAL APPOINTMENTS OR FROM GETTING MEDICATIONS?: NO

## 2020-06-29 SDOH — ECONOMIC STABILITY: FOOD INSECURITY: WITHIN THE PAST 12 MONTHS, YOU WORRIED THAT YOUR FOOD WOULD RUN OUT BEFORE YOU GOT MONEY TO BUY MORE.: NEVER TRUE

## 2020-06-29 SDOH — ECONOMIC STABILITY: TRANSPORTATION INSECURITY
IN THE PAST 12 MONTHS, HAS LACK OF TRANSPORTATION KEPT YOU FROM MEETINGS, WORK, OR FROM GETTING THINGS NEEDED FOR DAILY LIVING?: NO

## 2020-06-29 ASSESSMENT — PATIENT HEALTH QUESTIONNAIRE - PHQ9
SUM OF ALL RESPONSES TO PHQ QUESTIONS 1-9: 0
2. FEELING DOWN, DEPRESSED OR HOPELESS: 0
SUM OF ALL RESPONSES TO PHQ9 QUESTIONS 1 & 2: 0
SUM OF ALL RESPONSES TO PHQ QUESTIONS 1-9: 0
1. LITTLE INTEREST OR PLEASURE IN DOING THINGS: 0

## 2020-06-29 ASSESSMENT — ENCOUNTER SYMPTOMS
ABDOMINAL PAIN: 0
DIARRHEA: 1
VOMITING: 0
BOWEL INCONTINENCE: 0
BACK PAIN: 1

## 2020-06-29 ASSESSMENT — PAIN SCALES - GENERAL
PAINLEVEL_OUTOF10: 10
PAINLEVEL_OUTOF10: 8
PAINLEVEL_OUTOF10: 5
PAINLEVEL_OUTOF10: 10

## 2020-06-29 ASSESSMENT — PAIN DESCRIPTION - PROGRESSION: CLINICAL_PROGRESSION: GRADUALLY IMPROVING

## 2020-06-29 ASSESSMENT — PAIN DESCRIPTION - PAIN TYPE
TYPE: ACUTE PAIN
TYPE: ACUTE PAIN

## 2020-06-29 ASSESSMENT — PAIN DESCRIPTION - LOCATION: LOCATION: BACK

## 2020-06-29 NOTE — PROGRESS NOTES
Subjective:      Visit Information    Have you changed or started any medications since your last visit including any over-the-counter medicines, vitamins, or herbal medicines? yes - Medication list updated   Are you having any side effects from any of your medications? -  no  Have you stopped taking any of your medications? Is so, why? -  yes - Medication list updated     Have you seen any other physician or provider since your last visit? No  Have you had any other diagnostic tests since your last visit? No  Have you been seen in the emergency room and/or had an admission to a hospital since we last saw you? No  Have you had your routine dental cleaning in the past 6 months? no    Have you activated your [employer] account? If not, what are your barriers?  Yes     Patient Care Team:  TENZIN Barry CNP as PCP - [facility] E. Baldwin Peak Mio, APRN - CNP as PCP - [facility] Provider    Medical History Review  Past Medical, Family, and Social History reviewed and does contribute to the patient presenting condition    Health Maintenance   Topic Date Due    Lipid screen  01/19/2020    TSH testing  01/19/2020    DTaP/Tdap/Td vaccine (1 - Tdap) 07/20/2020 (Originally 2/15/1992)    Hepatitis A vaccine (1 of 2 - Risk 2-dose series) 12/29/2020 (Originally 2/15/1974)    Diabetic foot exam  06/29/2021 (Originally 1/22/2020)    Diabetic microalbuminuria test  06/29/2021 (Originally 1/19/2020)    Hepatitis B vaccine (1 of 3 - Risk 3-dose series) 06/29/2021 (Originally 2/15/1992)    Cervical cancer screen  06/29/2021 (Originally 2/15/1994)    Annual Wellness Visit (AWV)  06/29/2021 (Originally 5/29/2019)    Flu vaccine (Season Ended) 09/01/2020    Diabetic retinal exam  12/14/2020    A1C test (Diabetic or Prediabetic)  02/14/2021    Potassium monitoring  02/14/2021    Creatinine monitoring  02/14/2021    Pneumococcal 0-64 years Vaccine  Completed    HIV screen  Completed    Hib vaccine  Aged Salvador Obrien effort is normal. No respiratory distress. Breath sounds: Normal breath sounds. Musculoskeletal:      Right hip: She exhibits bony tenderness. She exhibits normal range of motion, no swelling, no crepitus and no deformity. Cervical back: She exhibits normal range of motion, no bony tenderness and no pain. Thoracic back: She exhibits normal range of motion, no bony tenderness and no pain. Lumbar back: She exhibits decreased range of motion, tenderness (right paraspinal) and pain. She exhibits no bony tenderness, no swelling and no edema. Right upper leg: She exhibits no tenderness, no bony tenderness, no swelling and no edema. Right lower leg: She exhibits no tenderness, no bony tenderness and no swelling. No edema. Skin:     General: Skin is warm. Findings: No rash. Neurological:      Mental Status: She is alert and oriented to person, place, and time. Comments: Negative SLR bilaterally. Psychiatric:         Mood and Affect: Mood normal.         Behavior: Behavior normal.         Assessment / Plan:     1. Chronic right-sided low back pain with right-sided sciatica    Will trial steroid dose pack as prescribed. Muscle relaxer and T#3 as needed for pain as she should not use NSAIDs with her poor kidney function. Referral to PT. Obtain xrays as ordered. - XR LUMBAR SPINE (MIN 4 VIEWS); Future  - External Referral To Physical Therapy  - methylPREDNISolone (MEDROL, ROMEL,) 4 MG tablet; Take by mouth as instructed by packet. Dispense: 1 kit; Refill: 0  - tiZANidine (ZANAFLEX) 2 MG tablet; Take 1 tablet by mouth every 8 hours as needed (back pain)  Dispense: 60 tablet; Refill: 0  - acetaminophen-codeine (TYLENOL/CODEINE #3) 300-30 MG per tablet; Take 1 tablet by mouth every 4 hours as needed for Pain for up to 7 days. Intended supply: 7 days. Take lowest dose possible to manage pain  Dispense: 40 tablet; Refill: 0    2.  Chronic right hip pain    - XR HIP RIGHT (2-3 VIEWS); Future  - External Referral To Physical Therapy  - methylPREDNISolone (MEDROL, ROMEL,) 4 MG tablet; Take by mouth as instructed by packet. Dispense: 1 kit; Refill: 0  - tiZANidine (ZANAFLEX) 2 MG tablet; Take 1 tablet by mouth every 8 hours as needed (back pain)  Dispense: 60 tablet; Refill: 0  - acetaminophen-codeine (TYLENOL/CODEINE #3) 300-30 MG per tablet; Take 1 tablet by mouth every 4 hours as needed for Pain for up to 7 days. Intended supply: 7 days. Take lowest dose possible to manage pain  Dispense: 40 tablet; Refill: 0    3. Controlled type 2 diabetes mellitus with stage 3 chronic kidney disease, with long-term current use of insulin ([facility])    - Hemoglobin A1C; Future        Will complete routine labs today. Follow up next month as planned. Stefany received counseling on the following healthy behaviors: exercise and medication adherence  Reviewed prior labs and health maintenance. Continue current medications, diet and exercise. Discussed use, benefit, and side effects of prescribed medications. Barriers to medication compliance addressed. Patient given educational materials - see patient instructions. All patient questions answered. Patient voiced understanding.        Electronically signed by TENZIN Lopez CNP on 6/29/2020 at 9:36 AM

## 2020-06-29 NOTE — PATIENT INSTRUCTIONS
use self-massage to unwind after work or school or to energize yourself in the morning. You can easily massage your feet, hands, or neck. Self-massage works best if you are in comfortable clothes and are sitting or lying in a comfortable position. Use oil or lotion to massage bare skin. · Reduce stress. Back pain can lead to a vicious [place]: Distress about the pain tenses the muscles in your back, which in turn causes more pain. Learn how to relax your mind and your muscles to lower your stress. Where can you learn more? Go to https://FemasyspeThinkUpeb.Tri[employer]. org and sign in to your [employer] account. Enter P503 in the "[employer] box to learn more about \"Learning About Relief for Back Pain. \"     If you do not have an account, please click on the \"Sign Up Now\" link. Current as of: March 2, 2020               Content Version: 12.5  © 6618-3497 [employer], Incorporated. Care instructions adapted under license by [facility] ([facility]). If you have questions about a medical condition or this instruction, always ask your healthcare professional. [place] [address] any warranty or liability for your use of this information.

## 2020-06-30 ENCOUNTER — APPOINTMENT (OUTPATIENT)
Dept: CT IMAGING | Age: 45
DRG: 683 | End: 2020-06-30
Payer: MEDICARE

## 2020-06-30 PROBLEM — M25.551 RIGHT HIP PAIN: Status: ACTIVE | Noted: 2020-06-30

## 2020-06-30 PROBLEM — N18.9 ACUTE KIDNEY INJURY SUPERIMPOSED ON CHRONIC KIDNEY DISEASE (HCC): Status: ACTIVE | Noted: 2020-06-29

## 2020-06-30 PROBLEM — Z95.828 S/P TIPS (TRANSJUGULAR INTRAHEPATIC PORTOSYSTEMIC SHUNT): Status: ACTIVE | Noted: 2020-06-30

## 2020-06-30 PROBLEM — N17.0 ACUTE KIDNEY INJURY (AKI) WITH ACUTE TUBULAR NECROSIS (ATN) (HCC): Status: ACTIVE | Noted: 2020-06-30

## 2020-06-30 LAB
-: ABNORMAL
ALBUMIN SERPL-MCNC: 3.5 G/DL (ref 3.5–5.2)
ALBUMIN/GLOBULIN RATIO: 1.3 (ref 1–2.5)
ALP BLD-CCNC: 72 U/L (ref 35–104)
ALT SERPL-CCNC: 7 U/L (ref 5–33)
AMORPHOUS: ABNORMAL
ANION GAP SERPL CALCULATED.3IONS-SCNC: 17 MMOL/L (ref 9–17)
AST SERPL-CCNC: 11 U/L
BACTERIA: ABNORMAL
BILIRUB SERPL-MCNC: 0.19 MG/DL (ref 0.3–1.2)
BILIRUBIN URINE: NEGATIVE
BUN BLDV-MCNC: 58 MG/DL (ref 6–20)
BUN/CREAT BLD: ABNORMAL (ref 9–20)
C-REACTIVE PROTEIN: 0.7 MG/L (ref 0–5)
CALCIUM SERPL-MCNC: 9.4 MG/DL (ref 8.6–10.4)
CASTS UA: ABNORMAL /LPF (ref 0–8)
CHLORIDE BLD-SCNC: 106 MMOL/L (ref 98–107)
CO2: 18 MMOL/L (ref 20–31)
COLOR: YELLOW
COMPLEMENT C3: 95 MG/DL (ref 90–180)
COMPLEMENT C4: 27 MG/DL (ref 10–40)
CREAT SERPL-MCNC: 3.18 MG/DL (ref 0.5–0.9)
CREATININE URINE: 41.7 MG/DL (ref 28–217)
CRYSTALS, UA: ABNORMAL /HPF
EOSINOPHIL,URINE: NORMAL
EPITHELIAL CELLS UA: ABNORMAL /HPF (ref 0–5)
FREE KAPPA/LAMBDA RATIO: 1.43 (ref 0.26–1.65)
GFR AFRICAN AMERICAN: 19 ML/MIN
GFR NON-AFRICAN AMERICAN: 16 ML/MIN
GFR SERPL CREATININE-BSD FRML MDRD: ABNORMAL ML/MIN/{1.73_M2}
GFR SERPL CREATININE-BSD FRML MDRD: ABNORMAL ML/MIN/{1.73_M2}
GLUCOSE BLD-MCNC: 101 MG/DL (ref 65–105)
GLUCOSE BLD-MCNC: 146 MG/DL (ref 65–105)
GLUCOSE BLD-MCNC: 157 MG/DL (ref 70–99)
GLUCOSE BLD-MCNC: 186 MG/DL (ref 65–105)
GLUCOSE BLD-MCNC: 264 MG/DL (ref 65–105)
GLUCOSE URINE: NEGATIVE
HCT VFR BLD CALC: 35.4 % (ref 36.3–47.1)
HEMOGLOBIN: 10.8 G/DL (ref 11.9–15.1)
INR BLD: 1
KAPPA FREE LIGHT CHAINS QNT: 3.84 MG/DL (ref 0.37–1.94)
KETONES, URINE: NEGATIVE
LAMBDA FREE LIGHT CHAINS QNT: 2.68 MG/DL (ref 0.57–2.63)
LEUKOCYTE ESTERASE, URINE: ABNORMAL
MAGNESIUM: 2 MG/DL (ref 1.6–2.6)
MCH RBC QN AUTO: 28.1 PG (ref 25.2–33.5)
MCHC RBC AUTO-ENTMCNC: 30.5 G/DL (ref 28.4–34.8)
MCV RBC AUTO: 92.2 FL (ref 82.6–102.9)
MUCUS: ABNORMAL
NITRITE, URINE: NEGATIVE
NRBC AUTOMATED: 0 PER 100 WBC
OTHER OBSERVATIONS UA: ABNORMAL
PDW BLD-RTO: 12.8 % (ref 11.8–14.4)
PH UA: 5.5 (ref 5–8)
PLATELET # BLD: 134 K/UL (ref 138–453)
PMV BLD AUTO: 11.3 FL (ref 8.1–13.5)
POTASSIUM SERPL-SCNC: 4.2 MMOL/L (ref 3.7–5.3)
PROTEIN UA: NEGATIVE
PROTHROMBIN TIME: 10.7 SEC (ref 9–12)
RBC # BLD: 3.84 M/UL (ref 3.95–5.11)
RBC UA: ABNORMAL /HPF (ref 0–4)
RENAL EPITHELIAL, UA: ABNORMAL /HPF
SEDIMENTATION RATE, ERYTHROCYTE: 52 MM (ref 0–20)
SODIUM BLD-SCNC: 141 MMOL/L (ref 135–144)
SODIUM,UR: 36 MMOL/L
SODIUM,UR: 49 MMOL/L
SPECIFIC GRAVITY UA: 1.01 (ref 1–1.03)
TOTAL PROTEIN, URINE: 4 MG/DL
TOTAL PROTEIN, URINE: 6 MG/DL
TOTAL PROTEIN: 6.1 G/DL (ref 6.4–8.3)
TRICHOMONAS: ABNORMAL
TURBIDITY: CLEAR
URINE HGB: NEGATIVE
UROBILINOGEN, URINE: NORMAL
WBC # BLD: 7.4 K/UL (ref 3.5–11.3)
WBC UA: ABNORMAL /HPF (ref 0–5)
YEAST: ABNORMAL

## 2020-06-30 PROCEDURE — 82947 ASSAY GLUCOSE BLOOD QUANT: CPT

## 2020-06-30 PROCEDURE — 2500000003 HC RX 250 WO HCPCS: Performed by: INTERNAL MEDICINE

## 2020-06-30 PROCEDURE — 85610 PROTHROMBIN TIME: CPT

## 2020-06-30 PROCEDURE — 82570 ASSAY OF URINE CREATININE: CPT

## 2020-06-30 PROCEDURE — 6370000000 HC RX 637 (ALT 250 FOR IP): Performed by: NURSE PRACTITIONER

## 2020-06-30 PROCEDURE — 36415 COLL VENOUS BLD VENIPUNCTURE: CPT

## 2020-06-30 PROCEDURE — 84300 ASSAY OF URINE SODIUM: CPT

## 2020-06-30 PROCEDURE — 2580000003 HC RX 258: Performed by: INTERNAL MEDICINE

## 2020-06-30 PROCEDURE — 86160 COMPLEMENT ANTIGEN: CPT

## 2020-06-30 PROCEDURE — 86334 IMMUNOFIX E-PHORESIS SERUM: CPT

## 2020-06-30 PROCEDURE — 99221 1ST HOSP IP/OBS SF/LOW 40: CPT | Performed by: INTERNAL MEDICINE

## 2020-06-30 PROCEDURE — 84156 ASSAY OF PROTEIN URINE: CPT

## 2020-06-30 PROCEDURE — 85027 COMPLETE CBC AUTOMATED: CPT

## 2020-06-30 PROCEDURE — 83883 ASSAY NEPHELOMETRY NOT SPEC: CPT

## 2020-06-30 PROCEDURE — 99232 SBSQ HOSP IP/OBS MODERATE 35: CPT | Performed by: INTERNAL MEDICINE

## 2020-06-30 PROCEDURE — 74176 CT ABD & PELVIS W/O CONTRAST: CPT

## 2020-06-30 PROCEDURE — 83735 ASSAY OF MAGNESIUM: CPT

## 2020-06-30 PROCEDURE — 80053 COMPREHEN METABOLIC PANEL: CPT

## 2020-06-30 PROCEDURE — 1200000000 HC SEMI PRIVATE

## 2020-06-30 PROCEDURE — 2580000003 HC RX 258: Performed by: NURSE PRACTITIONER

## 2020-06-30 PROCEDURE — 86038 ANTINUCLEAR ANTIBODIES: CPT

## 2020-06-30 PROCEDURE — 87205 SMEAR GRAM STAIN: CPT

## 2020-06-30 RX ORDER — LIDOCAINE 4 G/G
1 PATCH TOPICAL DAILY
Status: DISCONTINUED | OUTPATIENT
Start: 2020-06-30 | End: 2020-07-02 | Stop reason: HOSPADM

## 2020-06-30 RX ORDER — LEVOTHYROXINE SODIUM 0.05 MG/1
50 TABLET ORAL DAILY
Status: DISCONTINUED | OUTPATIENT
Start: 2020-07-01 | End: 2020-07-02 | Stop reason: HOSPADM

## 2020-06-30 RX ORDER — CYCLOBENZAPRINE HCL 10 MG
10 TABLET ORAL 3 TIMES DAILY PRN
Status: DISCONTINUED | OUTPATIENT
Start: 2020-06-30 | End: 2020-07-02 | Stop reason: HOSPADM

## 2020-06-30 RX ORDER — MIDODRINE HYDROCHLORIDE 5 MG/1
5 TABLET ORAL DAILY
Status: DISCONTINUED | OUTPATIENT
Start: 2020-06-30 | End: 2020-07-02 | Stop reason: HOSPADM

## 2020-06-30 RX ADMIN — Medication: at 15:58

## 2020-06-30 RX ADMIN — SODIUM CHLORIDE, PRESERVATIVE FREE 10 ML: 5 INJECTION INTRAVENOUS at 22:57

## 2020-06-30 RX ADMIN — CYCLOBENZAPRINE 10 MG: 10 TABLET, FILM COATED ORAL at 09:17

## 2020-06-30 RX ADMIN — INSULIN LISPRO 3 UNITS: 100 INJECTION, SOLUTION INTRAVENOUS; SUBCUTANEOUS at 13:17

## 2020-06-30 RX ADMIN — INSULIN LISPRO 1 UNITS: 100 INJECTION, SOLUTION INTRAVENOUS; SUBCUTANEOUS at 20:26

## 2020-06-30 RX ADMIN — MIDODRINE HYDROCHLORIDE 5 MG: 5 TABLET ORAL at 22:56

## 2020-06-30 RX ADMIN — ACETAMINOPHEN 650 MG: 325 TABLET ORAL at 02:34

## 2020-06-30 RX ADMIN — Medication: at 23:38

## 2020-06-30 RX ADMIN — ACETAMINOPHEN 650 MG: 325 TABLET ORAL at 20:25

## 2020-06-30 RX ADMIN — CYCLOBENZAPRINE 10 MG: 10 TABLET, FILM COATED ORAL at 20:24

## 2020-06-30 RX ADMIN — INSULIN LISPRO 1 UNITS: 100 INJECTION, SOLUTION INTRAVENOUS; SUBCUTANEOUS at 09:22

## 2020-06-30 RX ADMIN — ACETAMINOPHEN 650 MG: 325 TABLET ORAL at 13:21

## 2020-06-30 ASSESSMENT — ENCOUNTER SYMPTOMS
PHOTOPHOBIA: 0
WHEEZING: 0
BLOOD IN STOOL: 0
SHORTNESS OF BREATH: 0
CONSTIPATION: 0
TROUBLE SWALLOWING: 0
NAUSEA: 0
DIARRHEA: 0
CHEST TIGHTNESS: 0
VOMITING: 0
SORE THROAT: 0
ABDOMINAL DISTENTION: 0
RHINORRHEA: 0
ABDOMINAL PAIN: 0
BACK PAIN: 1
COUGH: 0

## 2020-06-30 ASSESSMENT — PAIN SCALES - GENERAL
PAINLEVEL_OUTOF10: 0
PAINLEVEL_OUTOF10: 5
PAINLEVEL_OUTOF10: 6
PAINLEVEL_OUTOF10: 8
PAINLEVEL_OUTOF10: 9
PAINLEVEL_OUTOF10: 3
PAINLEVEL_OUTOF10: 6

## 2020-06-30 ASSESSMENT — PAIN DESCRIPTION - LOCATION
LOCATION: HIP
LOCATION: HIP
LOCATION: HIP;LEG;BUTTOCKS

## 2020-06-30 ASSESSMENT — PAIN DESCRIPTION - PAIN TYPE
TYPE: ACUTE PAIN

## 2020-06-30 NOTE — PLAN OF CARE
Problem: Falls - Risk of:  Goal: Will remain free from falls  Description: Will remain free from falls  Outcome: Met This Shift  Note: Bed in lowest position. Wheels locked. Call light within reach. Hourly rounding. Safety maintained. Problem: Pain:  Goal: Pain level will decrease  Description: Pain level will decrease  6/30/2020 1255 by Faith Sloan RN  Outcome: Ongoing  Note: Pain assessed per protocol. PRN medication administered per scale. Reassessment of pain completed per protocol.

## 2020-06-30 NOTE — CONSULTS
NEPHROLOGY     REASON FOR CONSULT:     MICHELLE (BUN/Creat 3.18          with baseline creatinine    1.4-1.6            )    Risk Factors for MICHELLE:  #1 volume depletion #2 hypercalcemia #3 nonsteroidal intermittent use    HISTORY OF PRESENTING ILLNESS                 This is a 50 y.o. female who was recommended to go to the ER when noted to have elevated BUN/creatinine as per primary care physician and routine lab test.  Individual has been suffering from right hip pain along with low back pain which has been radiating to her right lower extremities last couple of weeks. She started taking Tylenol along with the Motrin 1600 mg every day. She was also supposed to see physical therapist for that. No imaging studies have been carried out. Initial assessment in the ER confirmed acute kidney failure along with hypercalcemia 12.2. She has been managed with IV fluids her creatinine has remained pretty much the same at 3.1 prompting nephrology consultation. Her calcium is definitely improved. Ultrasound and CT scan done without contrast showed no hydronephrosis. Has evidence of chronic peritonitis and cirrhosis of liver. We have been consulted for renal insufficiency. Baseline creatinine seems to be in the range of 1.4-1.6.   Never seen a nephrologist in the past.  Urine output  No history of contrast exposure  No history of hypotension  No history of ACE/ARB/nephrotoxic agents  No history suggestive of obstruction  No history of nausea/vomiting/diarrhoea  No history of MICHELLE in past  No history of recurrent UTI infection/surgeries to [facility]        PAST MEDICAL HISTORY         Diagnosis Date    Chronic back pain     buldging disc    Chronic kidney disease     Depression     Fatty liver     Gastroparesis     Generalized epilepsy (HCC)     Hyperlipidemia     Hypothyroidism     Migraine without aura     Neuropathy     Osteoarthritis     Pancreas cyst     Pancreatitis     Sepsis (HCC)     Tic disorder PAST SURGICAL HISTORY          Procedure Laterality Date     SECTION      CHOLECYSTECTOMY      COLONOSCOPY      ERCP  2016    LUNG SURGERY      empyema drained from lung    UPPER GASTROINTESTINAL ENDOSCOPY      esophageal and gastric inflammation       MEDICATIONS     Home Meds:                Medications Prior to Admission: methylPREDNISolone (MEDROL, ROMEL,) 4 MG tablet, Take by mouth as instructed by packet. tiZANidine (ZANAFLEX) 2 MG tablet, Take 1 tablet by mouth every 8 hours as needed (back pain)  acetaminophen-codeine (TYLENOL/CODEINE #3) 300-30 MG per tablet, Take 1 tablet by mouth every 4 hours as needed for Pain for up to 7 days. Intended supply: 7 days. Take lowest dose possible to manage pain  levothyroxine (SYNTHROID) 50 MCG tablet, Take 1 tablet by mouth Daily  midodrine (PROAMATINE) 5 MG tablet, Take 1 tablet by mouth daily Do not give after 1800 or within 4 hrs of bedtime. blood glucose test strips (FREESTYLE LITE) strip, 1 each by Other route 3 times daily  blood glucose test strips (ONE TOUCH ULTRA TEST) strip, 1 each by In Vitro route daily  Multiple Vitamins-Minerals (MULTIVITAMIN PO), Take  by mouth. Scheduled Meds:    [START ON 2020] levothyroxine  50 mcg Oral Daily    lidocaine  1 patch Transdermal Daily    midodrine  5 mg Oral Daily    sodium chloride flush  10 mL Intravenous 2 times per day    heparin (porcine)  5,000 Units Subcutaneous 3 times per day    insulin lispro  0-6 Units Subcutaneous TID WC    insulin lispro  0-3 Units Subcutaneous Nightly     Continuous Infusions:    IV infusion builder      dextrose       PRN Meds:  cyclobenzaprine, sodium chloride flush, acetaminophen **OR** acetaminophen, promethazine **OR** ondansetron, nicotine, glucose, dextrose, glucagon (rDNA), dextrose    ALLERGY     Patient has no known allergies.        SOCIAL HISTORY     Social History     Socioeconomic History    Marital status:      Spouse name: Not on file    Number of children: Not on file    Years of education: Not on file    Highest education level: Not on file   Occupational History    Not on file   Social Needs    Financial resource strain: Not hard at all    Food insecurity     Worry: Never true     Inability: Never true   [employer] needs     Medical: No     Non-medical: No   Tobacco Use    Smoking status: Current Every Day Smoker     Packs/day: 1.00     Years: 31.00     Pack years: 31.00     Types: Cigarettes     Start date: 6/22/2015    Smokeless tobacco: Never Used    Tobacco comment: pt accepting of nicotine replacement    Substance and Sexual Activity    Alcohol use: No     Alcohol/week: 0.0 standard drinks     Comment: hx of heavy alcohol use, quit 2012    Drug use: Yes     Types: Marijuana    Sexual activity: Not on file   Lifestyle    Physical activity     Days per week: Not on file     Minutes per session: Not on file    Stress: Not on file   Relationships    Social connections     Talks on phone: Not on file     Gets together: Not on file     Attends [employer] service: Not on file     Active member of club or organization: Not on file     Attends meetings of clubs or organizations: Not on file     Relationship status: Not on file    Intimate partner violence     Fear of current or ex partner: Not on file     Emotionally abused: Not on file     Physically abused: Not on file     Forced sexual activity: Not on file   Other Topics Concern    Not on file   Social History Narrative    Not on file       FAMILY HISTORY     Family History   Problem Relation Age of Onset    Diabetes Father     Coronary Art Dis Father     Stroke Father     Other Father     COPD Mother     Heart Disease Mother     Diabetes Mother     Arthritis Mother     High Cholesterol Mother           REVIEW OF SYSTEM     Constitutional: No asthenia/weight loss/anorexia    HEENT : No epistaxis/visual blurriness/rhinorrhoea/sorethroat/trauma  Cardiovascular:No chest pain/palpitation/SOB  Respiratory: No cough/fever/SOB/Wheezing    Gastrointestinal: No abdominal pain/nausea/vomiting/diarrhoea/constipation  Genitourinary: No dysuria/pyuria/hematuria/incomplete emptying of bladder  Musculoskeletal:  No gait disturbance/weakness or joint complaints present hip pain  Integumentary: No rash or pruritis. Neurological: No headache/diplopia/change in muscle strength/numbness or tingling. No change in gait, balance, coordination, mood, affect, memory, mentation, behavior. Psychiatric: No anxiety/depression. Endocrine: No temperature intolerance. No excessive thirst, fluid intake, or urination. No tremor. Hematologic/Lymphatic: No abnormal bruising or bleeding, blood clots or swolle lymph nodes. Allergic/Immunologic: No nasal congestion or hives      PHYSICAL EXAM     Vitals:    06/29/20 2213 06/29/20 2215 06/29/20 2245 06/30/20 0818   BP:  (!) 120/97 109/65 117/70   Pulse:   78 72   Resp:   16 16   Temp:   97.9 °F (36.6 °C) 97.7 °F (36.5 °C)   TempSrc:   Oral Oral   SpO2: 100% 100% 100% 100%   Weight:       Height:         24HR INTAKE/OUTPUT:      Intake/Output Summary (Last 24 hours) at 6/30/2020 1447  Last data filed at 6/30/2020 0455  Gross per 24 hour   Intake 450 ml   Output 400 ml   Net 50 ml       General appearance:Awake, alert, in no acute distress  Skin: warm and dry, no rash or erythema  Eyes: conjunctivae normal and sclera anicteric  ENT: no thrush no pharyngeal congestion  orodental hygiene   Neck: No JVD, Lymphadenopathty or thyromegaly  Respiratory: vesicular breath sounds,no wheeze/crackles  Cardiovascular: S1 S2 normal,no gallop or organic murmur. No carotid bruit  Abdomen:Non tender/non distended. Bowel sounds present  Extremities: No Cyanosis or Clubbing,Lower extremity edema  Neurological:Alert and oriented. No abnormalities of mood, affect, memory, mentation, or behavior are noted    INVESTIGATIONS CBC:   Recent Labs     06/29/20 2106 06/30/20  0556   WBC 9.7 7.4   RBC 4.49 3.84*   HGB 12.5 10.8*   HCT 40.5 35.4*   MCV 90.2 92.2   MCH 27.8 28.1   MCHC 30.9 30.5   RDW 12.8 12.8    134*   MPV 11.5 11.3      BMP:   Recent Labs     06/29/20  0930 06/29/20 2106 06/30/20  0556    134* 141   K 4.7 4.8 4.2   CL 96* 98 106   CO2 21 20 18*   BUN 58* 56* 58*   CREATININE 3.12* 2.96* 3.18*   GLUCOSE 155* 107* 157*   CALCIUM 12.2* 11.6* 9.4        Phosphorus:  No results for input(s): PHOS in the last 72 hours. Magnesium:   Recent Labs     06/30/20  0556   MG 2.0     Albumin:   Recent Labs     06/29/20 0930 06/30/20  0556   LABALBU 4.5 3.5       Radiology:  Reviewed as available. ASSESSMENT     1. Acute kidney injury nonoliguric secondary to ischemic ATIN related to NSAID/volume depletion/hypercalcemia. Doubt GN from NSAID  2. Hypercalcemia secondary to volume depletion -improved with hydration  3. Metabolic acidosis secondary to acute kidney injury  4. Chronic kidney disease stage III secondary to ischemic nephrosclerosis with baseline creatinine 1.4-1.6. Ultrasound shows bilateral small kidneys. 5.  History of alcohol cirrhosis of liver  6. History of chronic pancreatitis  7. Chronic alcohol use  8. Type 2 diabetes     PLAN:     1. Fluids  2. Check PVR  3. No NSAID  4. Anticipate renal recovery  5. Serologies as ordered  6. Will follow      Thank you for the consultation. Please do not hesitate to call with questions. This note is created with the assistance of a speech-recognition program. While intending to generate a document that actually reflects the content of the visit, no guarantees can be provided that every mistake has been identified and corrected by editing.     Sri Russell MD, [facility] Bennietomasa Hebert, FACP   6/30/2020 2:47 PM    [facility]

## 2020-06-30 NOTE — PLAN OF CARE
Problem: Pain:  Goal: Pain level will decrease  Description: Pain level will decrease  6/30/2020 1932 by Suzie Zayas RN  Outcome: Ongoing  6/30/2020 1255 by Faith Sloan RN  Outcome: Ongoing  Note: Pain assessed per protocol. PRN medication administered per scale. Reassessment of pain completed per protocol. Goal: Control of acute pain  Description: Control of acute pain  Outcome: Ongoing  Goal: Control of chronic pain  Description: Control of chronic pain  Outcome: Ongoing     Problem: Serum Glucose Level - Abnormal:  Goal: Ability to maintain appropriate glucose levels will improve  Description: Ability to maintain appropriate glucose levels will improve  6/30/2020 1932 by Suzie Zayas RN  Outcome: Ongoing  6/30/2020 1255 by Faith Sloan RN  Outcome: Ongoing  Note: Accuchecks ACHS and Insulin SS     Problem: Falls - Risk of:  Goal: Will remain free from falls  Description: Will remain free from falls  6/30/2020 1932 by Suzie Zayas RN  Outcome: Ongoing  6/30/2020 1255 by Faith Sloan RN  Outcome: Met This Shift  Note: Bed in lowest position. Wheels locked. Call light within reach. Hourly rounding. Safety maintained.     Goal: Absence of physical injury  Description: Absence of physical injury  Outcome: Ongoing

## 2020-06-30 NOTE — ED PROVIDER NOTES
[facility]  Emergency Department  Faculty Attestation       I performed a history and physical examination of the patient and discussed management with the resident. I reviewed the residents note and agree with the documented findings including all diagnostic interpretations and plan of care. Any areas of disagreement are noted on the chart. I was personally present for the key portions of any procedures. I have documented in the chart those procedures where I was not present during the key portions. I have reviewed the emergency nurses triage note. I agree with the chief complaint, past medical history, past surgical history, allergies, medications, social and family history as documented unless otherwise noted below. Documentation of the HPI, Physical Exam and Medical Decision Making performed by cynthia is based on my personal performance of the HPI, PE and MDM. For Physician Assistant/ Nurse Practitioner cases/documentation I have personally evaluated this patient and have completed at least one if not all key elements of the E/M (history, physical exam, and MDM). Additional findings are as noted. Pertinent Comments     Primary Care Physician: Chris Beaver, APRN - CNP    ED Triage Vitals [06/29/20 2042]   BP Temp Temp Source Pulse Resp SpO2 Height Weight   (!) 129/115 98.6 °F (37 °C) Oral 93 16 100 % 5' 3\" (1.6 m) 102 lb (46.3 kg)        History/Physical: This is a 50 y.o. female who presents to the Emergency Department with complaint of back pain. Has been worsening over time. Found to have MICHELLE but outpatient provider. Still making urine. Has been taking 1600 mg of ibuprofen nightly. On exam thin in NAD. Heart sounds RRR with no r/m/g. Lungs CTAB. Abdomen soft and nontender. No CVA tenderness. There is right lower back and right SI tenderness. Normal Neuro exam     MDM/Plan: MICHELLE. Dehydration vs ATN from motrin. Symptomatic treatment. Fluids. PLan for admit. CRITICAL CARE: None     Patient was screened and has no clinical signs or symptoms of a CoVID-19 infection at this time. However, given current pandemic and atypical presentations, face mask, eye protection, and gloves were worn during examination.       Ronald Zamora MD  Attending Emergency Physician         Ronald Zamora MD  06/30/20 0352

## 2020-06-30 NOTE — PROGRESS NOTES
[facility]    Progress Note    6/30/2020    2:53 PM    Name:   Basim Be  MRN:     [id number]     Acct:      [de-identified]   Room:   [address] [place] [address] Day:  0  Admit Date:  6/29/2020  8:37 PM    PCP:   TENZIN Mejia CNP  Code Status:  Full Code    Subjective:     C/C:   Chief Complaint   Patient presents with    Back Pain       Interval History Status: not changed. Patient is feeling better this morning, hip pain is a little better,  Has been urinating some,  Denies any dysuria    Brief History:     47-year-old female with underlying history of type 2 diabetes, chronic kidney disease with baseline creatinine around 1.6, hypothyroidism, hyperlipidemia, epilepsy, gastroparesis, cirrhosis of the liver with portal hypertension, TIPS, underlying psych problems presented with acute kidney injury superimposed on chronic kidney disease and severe right hip pain. Patient has been taking 800 mg of Motrin 2 tablets every night and daytime as well. Found to have creatinine of 3.1. Patient has been complaining of right hip pain and has been worked up for that had x-ray done without much abnormalities. Patient had a kidney ultrasound done which showed medullary nephrocalcinosis. Cortical thinning    Review of Systems:     Constitutional:  negative for chills, fevers, sweats  Respiratory:  negative for cough, dyspnea on exertion, shortness of breath, wheezing  Cardiovascular:  negative for chest pain, chest pressure/discomfort, lower extremity edema, palpitations  Gastrointestinal:  negative for abdominal pain, constipation, diarrhea, nausea, vomiting  Neurological:  negative for dizziness, headache    Medications:      Allergies:  No Known Allergies    Current Meds:   Scheduled Meds:    [START ON 7/1/2020] levothyroxine  50 mcg Oral Daily    lidocaine  1 patch Transdermal Daily    midodrine  5 mg Oral Daily    sodium chloride flush 10 mL Intravenous 2 times per day    heparin (porcine)  5,000 Units Subcutaneous 3 times per day    insulin lispro  0-6 Units Subcutaneous TID WC    insulin lispro  0-3 Units Subcutaneous Nightly     Continuous Infusions:    IV infusion builder      dextrose       PRN Meds: cyclobenzaprine, sodium chloride flush, acetaminophen **OR** acetaminophen, promethazine **OR** ondansetron, nicotine, glucose, dextrose, glucagon (rDNA), dextrose    Data:     Past Medical History:   has a past medical history of Chronic back pain, Chronic kidney disease, Depression, Fatty liver, Gastroparesis, Generalized epilepsy ([address] Hyperlipidemia, Hypothyroidism, Migraine without aura, Neuropathy, Osteoarthritis, Pancreas cyst, Pancreatitis, Sepsis ([facility] [address] and Tic disorder. Social History:   reports that she has been smoking cigarettes. She started smoking about 5 years ago. She has a 31.00 pack-year smoking history. She has never used smokeless tobacco. She reports current drug use. Drug: Marijuana. She reports that she does not drink alcohol. Family History:   Family History   Problem Relation Age of Onset    Diabetes Father     Coronary Art Dis Father     Stroke Father     Other Father     COPD Mother     Heart Disease Mother     Diabetes Mother     Arthritis Mother     High Cholesterol Mother        Vitals:  /70   Pulse 72   Temp 97.7 °F (36.5 °C) (Oral)   Resp 16   Ht 5' 3\" (1.6 m)   Wt 102 lb (46.3 kg)   SpO2 100%   BMI 18.07 kg/m²   Temp (24hrs), Av.1 °F (36.7 °C), Min:97.7 °F (36.5 °C), Max:98.6 °F (37 °C)    Recent Labs     20  2324 20  0733 20  1058   POCGLU 146* 146* 264*       I/O (24Hr):     Intake/Output Summary (Last 24 hours) at 2020 1453  Last data filed at 2020 0455  Gross per 24 hour   Intake 450 ml   Output 400 ml   Net 50 ml       Labs:  Hematology:  Recent Labs     20  2106 20  0556   WBC 9.7 7.4   RBC 4.49 3.84*   HGB 12.5 10.8*   HCT 40.5 35.4*   MCV 90.2 92.2   MCH 27.8 28.1   MCHC 30.9 30.5   RDW 12.8 12.8    134*   MPV 11.5 11.3   SEDRATE 52*  --    CRP 0.7  --    INR  --  1.0     Chemistry:  Recent Labs     06/29/20  0930 06/29/20  2106 06/30/20  0556    134* 141   K 4.7 4.8 4.2   CL 96* 98 106   CO2 21 20 18*   GLUCOSE 155* 107* 157*   BUN 58* 56* 58*   CREATININE 3.12* 2.96* 3.18*   MG  --   --  2.0   ANIONGAP 20* 16 17   LABGLOM 16* 17* 16*   GFRAA 19* 21* 19*   CALCIUM 12.2* 11.6* 9.4     Recent Labs     06/29/20  0930 06/29/20  2324 06/30/20  0556 06/30/20  0733 06/30/20  1058   PROT 8.1  --  6.1*  --   --    LABALBU 4.5  --  3.5  --   --    LABA1C 8.5*  --   --   --   --    TSH 1.81  --   --   --   --    AST 10  --  11  --   --    ALT 10  --  7  --   --    ALKPHOS 99  --  72  --   --    BILITOT 0.25*  --  0.19*  --   --    CHOL 184  --   --   --   --    HDL 52  --   --   --   --    LDLCHOLESTEROL 106  --   --   --   --    CHOLHDLRATIO 3.5  --   --   --   --    TRIG 129  --   --   --   --    VLDL NOT REPORTED  --   --   --   --    POCGLU  --  146*  --  146* 264*     ABG:No results found for: POCPH, PHART, PH, POCPCO2, CWN1XEZ, PCO2, POCPO2, PO2ART, PO2, POCHCO3, ZBM5UJS, HCO3, NBEA, PBEA, BEART, BE, THGBART, THB, AUR8JXC, OORS2FGA, D2DZTOGG, O2SAT, FIO2  Lab Results   Component Value Date/Time    SPECIAL NOT REPORTED 01/27/2015 12:05 PM     Lab Results   Component Value Date/Time    CULTURE NO GROWTH 01/27/2015 12:05 PM    CULTURE  01/27/2015 12:05 PM     Performed at [facility] [id number] [place] [address], [address] [place] [address] [phone]       Radiology:  Ct Abdomen Pelvis Wo Contrast Additional Contrast? None    Result Date: 6/30/2020  1. Interval TIPS procedure with the stent noted in the posterior right lobe of liver. 2. Interval development of bilateral medullary nephrocalcinosis. 3. Interval development of numerous pancreatic calcifications consistent with chronic pancreatitis.   Previous pancreatic and peripancreatic a cystic collections no longer appreciated. 4. Grade 1 L4-5 anterolisthesis. Us Retroperitoneal Limited    Result Date: 6/29/2020  1. Bilateral renal calculi. No hydronephrosis. 2. Atrophy and thinning of the bilateral renal cortex above consistent with chronic medical renal disease. Physical Examination:        General appearance:  alert, cooperative and no distress  Mental Status:  oriented to person, place and time and normal affect  Lungs:  clear to auscultation bilaterally, normal effort  Heart:  regular rate and rhythm, no murmur  Abdomen:  soft, nontender, nondistended, normal bowel sounds, no masses, hepatomegaly, splenomegaly  Extremities:  no edema, redness, tenderness in the calves  Skin:  no gross lesions, rashes, induration    Assessment:        Hospital Problems           Last Modified POA    * (Principal) Acute kidney injury superimposed on chronic kidney disease ([address] 6/30/2020 Yes    Chronic pancreatitis ([address] 6/30/2020 Yes    Controlled type 2 diabetes mellitus with stage 3 chronic kidney disease, with long-term current use of insulin ([address] 6/30/2020 Yes    Alcohol-induced chronic pancreatitis ([address] 6/30/2020 Yes    Cirrhosis of liver ([address] 6/30/2020 Yes    MICHELLE (acute kidney injury) ([address] 6/29/2020 Yes    Right hip pain 6/30/2020 Yes    Acute kidney injury (MICHELLE) with acute tubular necrosis (ATN) ([address] 6/30/2020 Yes    S/P TIPS (transjugular intrahepatic portosystemic shunt) 6/30/2020 Yes          Plan:        1. Continue hydration, discontinue Motrin and any other NSAIDs, strongly advised the patient not to take in the future. Nephrology consult,  2. Insulin sliding scale and diabetic diet, A1c 8.5, will increase the doses of insulin. Patient seems very noncompliant with diet and exercise  3. History of cirrhosis in the past and had TIPS procedure done, mentation normal,  4. We will give oxycodone for hip pain if needed.   5. Patient also has some extensive pain history due to chronic parotitis, had pain pump and spinal stimulator as per pain management.   6. DVT GI prophylaxis:      Cheikh Bhat MD  6/30/2020  2:53 PM

## 2020-06-30 NOTE — ED PROVIDER NOTES
[facility] ED  Emergency Department Encounter  Emergency Medicine Resident     Pt Name: Marcus Oakley  MRN: [id number]  [place] 1975  Date of evaluation: 20  PCP:  TENZIN Tyler CNP    CHIEF COMPLAINT       Chief Complaint   Patient presents with    Back Pain       HISTORY OFPRESENT ILLNESS  (Location/Symptom, Timing/Onset, Context/Setting, Quality, Duration, Modifying Factors,Severity.)      Marcus Oakley is a 50year old female who presents from [address] [place] [address] with concerns for an acute kidney injury. Patient is receiving basic lab work as well as work-up for right-sided lower sciatic back pain when her outpatient labs noted to have a large bump in her BUN creatinine consistent with acute kidney injury. Patient states that she has had sciatic right back pain rating down the right gluteus behind the right knee for period of weeks to months. Patient states that acutely pain has had an increase in severity, state that she ha been taking ibuprofen 800 around the clock. Patient also states that she has had fatigue for approximately 2 weeks duration. Patient has no midline tenderness to the lumbar thoracic spine. Based on this history and values concern for acute tubular necrosis versus acute kidney injury    PAST MEDICAL / SURGICAL / SOCIAL / FAMILY HISTORY      has a past medical history of Chronic back pain, Chronic kidney disease, Depression, Fatty liver, Gastroparesis, Generalized epilepsy ([address] Hyperlipidemia, Hypothyroidism, Migraine without aura, Neuropathy, Osteoarthritis, Pancreas cyst, Pancreatitis, Sepsis ([address] and Tic disorder. has a past surgical history that includes Colonoscopy; Upper gastrointestinal endoscopy;  section; Lung surgery; ERCP (2016); and Cholecystectomy.      Social History     Socioeconomic History    Marital status:      Spouse name: Not on file    Number of children: Not on file    Years of education: Not on file    Highest education level: Not on file   Occupational History    Not on file   Social Needs    Financial resource strain: Not hard at all    Food insecurity     Worry: Never true     Inability: Never true   [employer] needs     Medical: No     Non-medical: No   Tobacco Use    Smoking status: Current Every Day Smoker     Packs/day: 1.00     Years: 31.00     Pack years: 31.00     Types: Cigarettes     Start date: 6/22/2015    Smokeless tobacco: Never Used    Tobacco comment: pt accepting of nicotine replacement    Substance and Sexual Activity    Alcohol use: No     Alcohol/week: 0.0 standard drinks     Comment: hx of heavy alcohol use, quit 2012    Drug use: Yes     Types: Marijuana    Sexual activity: Not on file   Lifestyle    Physical activity     Days per week: Not on file     Minutes per session: Not on file    Stress: Not on file   Relationships    Social connections     Talks on phone: Not on file     Gets together: Not on file     Attends [employer] service: Not on file     Active member of club or organization: Not on file     Attends meetings of clubs or organizations: Not on file     Relationship status: Not on file    Intimate partner violence     Fear of current or ex partner: Not on file     Emotionally abused: Not on file     Physically abused: Not on file     Forced sexual activity: Not on file   Other Topics Concern    Not on file   Social History Narrative    Not on file       Family History   Problem Relation Age of Onset    Diabetes Father     Coronary Art Dis Father     Stroke Father     Other Father     COPD Mother     Heart Disease Mother     Diabetes Mother     Arthritis Mother     High Cholesterol Mother        Allergies:  Patient has no known allergies. Home Medications:  Prior to Admission medications    Medication Sig Start Date End Date Taking?  Authorizing Provider   methylPREDNISolone (MEDROL, ROMEL,) 4 MG tablet Take by mouth as instructed by packet. 6/29/20 7/5/20  Rosielaura Cardoso APRN - CNP   tiZANidine (ZANAFLEX) 2 MG tablet Take 1 tablet by mouth every 8 hours as needed (back pain) 6/29/20 6/29/21  Rosie Prow APRN - CNP   acetaminophen-codeine (TYLENOL/CODEINE #3) 300-30 MG per tablet Take 1 tablet by mouth every 4 hours as needed for Pain for up to 7 days. Intended supply: 7 days. Take lowest dose possible to manage pain 6/29/20 7/6/20  Rosie Prow APRN - CNP   levothyroxine (SYNTHROID) 50 MCG tablet Take 1 tablet by mouth Daily 6/16/20 6/16/21  Rosie w, APRN - CNP   midodrine (PROAMATINE) 5 MG tablet Take 1 tablet by mouth daily Do not give after 1800 or within 4 hrs of bedtime. 2/17/20 2/16/21  Rosie ProTENZIN christianson - CNP   blood glucose test strips (FREESTYLE LITE) strip 1 each by Other route 3 times daily 12/12/18 12/12/19  Rosie Prow, APRN - CNP   blood glucose test strips (ONE TOUCH ULTRA TEST) strip 1 each by In Vitro route daily 10/31/18 10/30/19  Rosie Prow APRN - CNP   Blood Gluc Meter Disp-Strips (BLOOD GLUCOSE METER DISPOSABLE) CASSY 1 each by Does not apply route 3 times daily Uses freestyle light glucometer 12/7/15 2/23/16  RosieTENZIN Aleman CNP   Multiple Vitamins-Minerals (MULTIVITAMIN PO) Take  by mouth. Historical Provider, MD       REVIEW OFSYSTEMS    (2-9 systems for level 4, 10 or more for level 5)      Review of Systems   Constitutional: Negative for chills, fatigue and fever. HENT: Negative for hearing loss, rhinorrhea, sneezing, sore throat, tinnitus and trouble swallowing. Eyes: Negative for photophobia and visual disturbance. Respiratory: Negative for chest tightness, shortness of breath and wheezing. Cardiovascular: Negative for chest pain and palpitations. Gastrointestinal: Negative for abdominal distention, abdominal pain, constipation, diarrhea, nausea and vomiting. Endocrine: Negative for polyuria.    Genitourinary: Negative for dysuria, flank pain, frequency, vaginal bleeding and vaginal discharge. Musculoskeletal: Positive for back pain. Negative for arthralgias, gait problem and neck pain. Neurological: Negative for dizziness, tremors, seizures, syncope, facial asymmetry, numbness and headaches. Psychiatric/Behavioral: Negative for self-injury and suicidal ideas. PHYSICAL EXAM   (up to 7 for level 4, 8 or more forlevel 5)      INITIAL VITALS:   ED Triage Vitals [06/29/20 2042]   BP Temp Temp Source Pulse Resp SpO2 Height Weight   (!) 129/115 98.6 °F (37 °C) Oral 93 16 100 % 5' 3\" (1.6 m) 102 lb (46.3 kg)       Physical Exam  Constitutional:       General: She is not in acute distress. Appearance: She is obese. She is not toxic-appearing or diaphoretic. HENT:      Head: Normocephalic and atraumatic. Eyes:      Extraocular Movements: Extraocular movements intact. Neck:      Musculoskeletal: No neck rigidity or muscular tenderness. Cardiovascular:      Rate and Rhythm: Normal rate and regular rhythm. Pulses: Normal pulses. Pulmonary:      Effort: No respiratory distress. Breath sounds: Normal breath sounds. No wheezing. Abdominal:      General: There is no distension. Palpations: Abdomen is soft. Tenderness: There is no abdominal tenderness. Musculoskeletal: Normal range of motion. Comments: No pain with palpation of the lumbar spine   Skin:     General: Skin is dry. Neurological:      General: No focal deficit present. Mental Status: She is oriented to person, place, and time. Psychiatric:         Mood and Affect: Mood normal.         Behavior: Behavior normal.         Thought Content:  Thought content normal.         Judgment: Judgment normal.         DIFFERENTIAL  DIAGNOSIS     PLAN (LABS / IMAGING / EKG):  Orders Placed This Encounter   Procedures    Urinalysis Reflex to Culture    CBC WITH AUTO DIFFERENTIAL    Basic Metabolic Panel    Inpatient consult to Hospitalist       MEDICATIONS ORDERED:  Orders Placed This Encounter   Medications    0.9 % sodium chloride bolus    acetaminophen (TYLENOL) tablet 1,000 mg    orphenadrine (NORFLEX) injection 60 mg       DDX: Cute tubular necrosis, acute kidney injury, ibuprofen overdose, sciatica, lumbar spine pain, herniated disc    Initial MDM/Plan: 50 y.o. female who presents with MICHELLE thought to be secondary to ibuprofen ingestion over chronic period of time, plan to repeat BMP, get CBC, urinalysis with reflex culture sent to look for casts and admit patient for management of acute kidney injury knee injury secondary to suspected acute tubular necrosis. Patient is still having good urine output, no discoloration of her urine, patient does not have blood per bowel, n dark sticky stools consistent with GI bleed that would explain alternative cause for his elevation in BUN/creatinine. Plan to treat with IV fluid hydration. Plan to treat back pain with Norflex and Tylenol    DIAGNOSTIC RESULTS / EMERGENCYDEPARTMENT COURSE / MDM     LABS:  Labs Reviewed   URINE RT REFLEX TO CULTURE   CBC WITH AUTO DIFFERENTIAL   BASIC METABOLIC PANEL         RADIOLOGY:  No results found. EMERGENCY DEPARTMENT COURSE:    Patient noted to have white count, electrolytes within normal limits, patient still in acute kidney injury based on repeat BUN and creatinine, patient admitted to [facility] for management of acute kidney injury thought to be secondary to acute tubular necrosis from ibuprofen ingestion      PROCEDURES:  None    CONSULTS:  IP CONSULT TO HOSPITALIST    CRITICAL CARE:  Please see attending note    FINAL IMPRESSION      1. MICHELLE (acute kidney injury) ([facility] [address]          DISPOSITION / PLAN     DISPOSITION Decision To Admit 06/29/2020 09:18:42 PM      PATIENT REFERRED TO:  No follow-up provider specified.     DISCHARGE MEDICATIONS:  New Prescriptions    No medications on file       Alena Ornelas MD  Emergency Medicine Resident    (Please note that portions of this note were completed with a voice recognition program.Efforts were made to edit the dictations but occasionally words are mis-transcribed.)       Becki Groves MD  Resident  06/30/20 2499

## 2020-06-30 NOTE — H&P
ULTRA TEST) strip 1 each by In Vitro route daily 10/31/18 10/30/19  TENZIN Ordonez CNP   Blood Gluc Meter Disp-Strips (BLOOD GLUCOSE METER DISPOSABLE) CASSY 1 each by Does not apply route 3 times daily Uses freestyle light glucometer 12/7/15 2/23/16  TENZIN Ordonez CNP   Multiple Vitamins-Minerals (MULTIVITAMIN PO) Take  by mouth. Historical Provider, MD        Allergies:     Patient has no known allergies. Social History:     Tobacco:    reports that she has been smoking cigarettes. She started smoking about 5 years ago. She has a 31.00 pack-year smoking history. She has never used smokeless tobacco.  Alcohol:      reports no history of alcohol use. Drug Use:  reports current drug use. Drug: Marijuana. Family History:     Family History   Problem Relation Age of Onset    Diabetes Father     Coronary Art Dis Father     Stroke Father     Other Father     COPD Mother     Heart Disease Mother     Diabetes Mother     Arthritis Mother     High Cholesterol Mother        Review of Systems:     Positive and Negative as described in HPI. Review of Systems   Constitutional: Positive for activity change (Secondary to the pain). Negative for chills, diaphoresis and fever. HENT: Negative for congestion. Eyes: Negative for visual disturbance. Respiratory: Negative for cough, chest tightness, shortness of breath and wheezing. Cardiovascular: Negative for chest pain, palpitations and leg swelling. Gastrointestinal: Negative for abdominal pain, blood in stool, constipation, diarrhea, nausea and vomiting. Genitourinary: Negative for difficulty urinating. Musculoskeletal: Positive for arthralgias, gait problem and joint swelling. Right hip pain radiation down the right leg   Neurological: Positive for weakness (Right leg) and numbness (Toes of the right foot with tingling). Negative for dizziness, light-headedness and headaches.    All other systems reviewed and are negative. Physical Exam:   BP (!) 120/97   Pulse 93   Temp 98.6 °F (37 °C) (Oral)   Resp 16   Ht 5' 3\" (1.6 m)   Wt 102 lb (46.3 kg)   SpO2 100%   BMI 18.07 kg/m²   Temp (24hrs), Av.5 °F (36.9 °C), Min:98.4 °F (36.9 °C), Max:98.6 °F (37 °C)    No results for input(s): POCGLU in the last 72 hours. No intake or output data in the 24 hours ending 20 [id number]    Physical Exam  Vitals signs and nursing note reviewed. Constitutional:       General: She is in acute distress (Secondary to the pain of the right hip which seems out of proportion it). Appearance: She is well-developed. She is not ill-appearing, toxic-appearing or diaphoretic. HENT:      Head: Normocephalic and atraumatic. Right Ear: Hearing normal.      Left Ear: Hearing normal.      Nose: Nose normal. No rhinorrhea. Eyes:      General: Lids are normal.      Extraocular Movements:      Right eye: Normal extraocular motion. Left eye: Normal extraocular motion. Conjunctiva/sclera: Conjunctivae normal.      Right eye: Right conjunctiva is not injected. Left eye: Left conjunctiva is not injected. Pupils: Pupils are equal, round, and reactive to light. Pupils are equal.      Right eye: Pupil is reactive. Left eye: Pupil is reactive. Neck:      Musculoskeletal: Neck supple. Thyroid: No thyromegaly. Vascular: No carotid bruit. Trachea: Trachea and phonation normal. No tracheal deviation. Cardiovascular:      Rate and Rhythm: Normal rate and regular rhythm. Pulses: Normal pulses. Heart sounds: Normal heart sounds. No murmur. Pulmonary:      Effort: Pulmonary effort is normal. No respiratory distress. Breath sounds: Normal breath sounds. No stridor. No decreased breath sounds. Abdominal:      General: Bowel sounds are normal. There is no distension. Palpations: Abdomen is soft. There is no mass. Tenderness: There is no abdominal tenderness.  There is no right CVA tenderness, left CVA tenderness or guarding. Musculoskeletal:      Right hip: She exhibits decreased range of motion and tenderness. She exhibits no crepitus and no deformity. Skin:     General: Skin is warm and dry. Findings: No erythema, lesion or rash. Neurological:      Mental Status: She is alert and oriented to person, place, and time. She is not disoriented. Cranial Nerves: No cranial nerve deficit. Psychiatric:         Speech: Speech normal.         Behavior: Behavior normal. Behavior is cooperative.          Investigations:      Laboratory Testing:  Recent Results (from the past 24 hour(s))   Hemoglobin A1C    Collection Time: 06/29/20  9:30 AM   Result Value Ref Range    Hemoglobin A1C 8.5 (H) 4.0 - 6.0 %    Estimated Avg Glucose 197 mg/dL   Comprehensive Metabolic Panel    Collection Time: 06/29/20  9:30 AM   Result Value Ref Range    Glucose 155 (H) 70 - 99 mg/dL    BUN 58 (H) 6 - 20 mg/dL    CREATININE 3.12 (H) 0.50 - 0.90 mg/dL    Bun/Cre Ratio NOT REPORTED 9 - 20    Calcium 12.2 (H) 8.6 - 10.4 mg/dL    Sodium 137 135 - 144 mmol/L    Potassium 4.7 3.7 - 5.3 mmol/L    Chloride 96 (L) 98 - 107 mmol/L    CO2 21 20 - 31 mmol/L    Anion Gap 20 (H) 9 - 17 mmol/L    Alkaline Phosphatase 99 35 - 104 U/L    ALT 10 5 - 33 U/L    AST 10 <32 U/L    Total Bilirubin 0.25 (L) 0.3 - 1.2 mg/dL    Total Protein 8.1 6.4 - 8.3 g/dL    Alb 4.5 3.5 - 5.2 g/dL    Albumin/Globulin Ratio 1.3 1.0 - 2.5    GFR Non- 16 (L) >60 mL/min    GFR  19 (L) >60 mL/min    GFR Comment          GFR Staging NOT REPORTED    Lipid Panel    Collection Time: 06/29/20  9:30 AM   Result Value Ref Range    Cholesterol 184 <200 mg/dL    HDL 52 >40 mg/dL    LDL Cholesterol 106 0 - 130 mg/dL    Chol/HDL Ratio 3.5 <5    Triglycerides 129 <150 mg/dL    VLDL NOT REPORTED 1 - 30 mg/dL   TSH without Reflex    Collection Time: 06/29/20  9:30 AM   Result Value Ref Range    TSH 1.81 0.30 - 5.00 mIU/L Microalbumin, Ur    Collection Time: 06/29/20  9:35 AM   Result Value Ref Range    Microalb, Ur <12 <21 mg/L    Creatinine, Ur 81.3 28.0 - 217.0 mg/dL    Microalb/Crt. Ratio CANNOT BE CALCULATED <25 mcg/mg creat   CBC WITH AUTO DIFFERENTIAL    Collection Time: 06/29/20  9:06 PM   Result Value Ref Range    WBC 9.7 3.5 - 11.3 k/uL    RBC 4.49 3.95 - 5.11 m/uL    Hemoglobin 12.5 11.9 - 15.1 g/dL    Hematocrit 40.5 36.3 - 47.1 %    MCV 90.2 82.6 - 102.9 fL    MCH 27.8 25.2 - 33.5 pg    MCHC 30.9 28.4 - 34.8 g/dL    RDW 12.8 11.8 - 14.4 %    Platelets 849 575 - 112 k/uL    MPV 11.5 8.1 - 13.5 fL    NRBC Automated 0.0 0.0 per 100 WBC    Differential Type NOT REPORTED     Seg Neutrophils 62 36 - 65 %    Lymphocytes 29 24 - 43 %    Monocytes 4 3 - 12 %    Eosinophils % 4 1 - 4 %    Basophils 1 0 - 2 %    Immature Granulocytes 0 0 %    Segs Absolute 6.15 1.50 - 8.10 k/uL    Absolute Lymph # 2.77 1.10 - 3.70 k/uL    Absolute Mono # 0.36 0.10 - 1.20 k/uL    Absolute Eos # 0.37 0.00 - 0.44 k/uL    Basophils Absolute 0.05 0.00 - 0.20 k/uL    Absolute Immature Granulocyte <0.03 0.00 - 0.30 k/uL    WBC Morphology NOT REPORTED     RBC Morphology NOT REPORTED     Platelet Estimate NOT REPORTED    Basic Metabolic Panel    Collection Time: 06/29/20  9:06 PM   Result Value Ref Range    Glucose 107 (H) 70 - 99 mg/dL    BUN 56 (H) 6 - 20 mg/dL    CREATININE 2.96 (H) 0.50 - 0.90 mg/dL    Bun/Cre Ratio NOT REPORTED 9 - 20    Calcium 11.6 (H) 8.6 - 10.4 mg/dL    Sodium 134 (L) 135 - 144 mmol/L    Potassium 4.8 3.7 - 5.3 mmol/L    Chloride 98 98 - 107 mmol/L    CO2 20 20 - 31 mmol/L    Anion Gap 16 9 - 17 mmol/L    GFR Non-African American 17 (L) >60 mL/min    GFR  21 (L) >60 mL/min    GFR Comment          GFR Staging NOT REPORTED        Imaging/Diagnostics:  No results found.     Assessment :      Hospital Problems           Last Modified POA    * (Principal) Acute kidney injury superimposed on chronic kidney disease ([facility] [address] 6/30/2020 Yes    Controlled type 2 diabetes mellitus with stage 3 chronic kidney disease, with long-term current use of insulin ([facility] [address] 6/30/2020 Yes    Alcohol-induced chronic pancreatitis ([facility] [address] 6/30/2020 Yes    Cirrhosis of liver ([facility] [address] 6/30/2020 Yes    MICHELLE (acute kidney injury) ([facility] [address] 6/29/2020 Yes    Right hip pain 6/30/2020 Yes          Plan:     Patient status observation in the Med/Surge    1. Obtain renal ultrasound for the acute renal failure rule out hydronephrosis or kidney stones, if obstructing consult urology. Also consider Motrin as a toxin for causative agent of an ATN  2. IV fluids for hydration and monitor  3. Monitor electrolytes given the elevated BUN and creatinine  4. Right hip pain seems to be a little bit disproportionate to a sciatica but does have very similar radiation down the leg no associated weakness noted on exam but she exhibits weakness with weightbearing therefore will obtain CT rule out pathological causes  5. Diabetes-insulin sliding scale for hyperglycemia prevent hypoglycemia optimization of medical management to prevent further progression of kidney disease  6. Chronic kidney disease -appears her baseline is 1.6-1.4, consider nephrology consult if no improvement with clinical hydration or hyperkalemia  7. Straight cath for urine  8. GI prophylaxis  9. DVT prophylaxis with heparin secondary to kidney function    Consultations:   IP CONSULT TO HOSPITALIST     Patient is admitted as inpatient status because of co-morbidities listed above, severity of signs and symptoms as outlined, requirement for current medical therapies and most importantly because of direct risk to patient if care not provided in a hospital setting.     Verner Argue, APRN - CNP  6/29/2020  10:18 PM    Copy sent to TENZIN Becerra - CIARA

## 2020-06-30 NOTE — ED TRIAGE NOTES
Pt arrived to  ED sent over by PCP with complaints of abnormal kidney function lab results and lower right back pain. Pt states her doctor told her she is in kidney failure. Pt is alert and oriented x4.

## 2020-07-01 LAB
ANION GAP SERPL CALCULATED.3IONS-SCNC: 12 MMOL/L (ref 9–17)
ANTI-NUCLEAR ANTIBODY (ANA): NEGATIVE
BILIRUBIN URINE: NEGATIVE
BUN BLDV-MCNC: 46 MG/DL (ref 6–20)
BUN/CREAT BLD: ABNORMAL (ref 9–20)
CALCIUM SERPL-MCNC: 7.6 MG/DL (ref 8.6–10.4)
CHLORIDE BLD-SCNC: 109 MMOL/L (ref 98–107)
CO2: 21 MMOL/L (ref 20–31)
COLOR: YELLOW
COMMENT UA: NORMAL
CREAT SERPL-MCNC: 2.62 MG/DL (ref 0.5–0.9)
GFR AFRICAN AMERICAN: 24 ML/MIN
GFR NON-AFRICAN AMERICAN: 20 ML/MIN
GFR SERPL CREATININE-BSD FRML MDRD: ABNORMAL ML/MIN/{1.73_M2}
GFR SERPL CREATININE-BSD FRML MDRD: ABNORMAL ML/MIN/{1.73_M2}
GLUCOSE BLD-MCNC: 105 MG/DL (ref 65–105)
GLUCOSE BLD-MCNC: 123 MG/DL (ref 65–105)
GLUCOSE BLD-MCNC: 125 MG/DL (ref 65–105)
GLUCOSE BLD-MCNC: 162 MG/DL (ref 70–99)
GLUCOSE BLD-MCNC: 191 MG/DL (ref 65–105)
GLUCOSE URINE: NEGATIVE
HCT VFR BLD CALC: 32.7 % (ref 36.3–47.1)
HEMOGLOBIN: 10.4 G/DL (ref 11.9–15.1)
KETONES, URINE: NEGATIVE
LEUKOCYTE ESTERASE, URINE: NEGATIVE
MCH RBC QN AUTO: 28.6 PG (ref 25.2–33.5)
MCHC RBC AUTO-ENTMCNC: 31.8 G/DL (ref 28.4–34.8)
MCV RBC AUTO: 89.8 FL (ref 82.6–102.9)
NITRITE, URINE: NEGATIVE
NRBC AUTOMATED: 0 PER 100 WBC
PATHOLOGIST: NORMAL
PDW BLD-RTO: 13.1 % (ref 11.8–14.4)
PH UA: 6 (ref 5–8)
PLATELET # BLD: ABNORMAL K/UL (ref 138–453)
PLATELET, FLUORESCENCE: 90 K/UL (ref 138–453)
PLATELET, IMMATURE FRACTION: 2.8 % (ref 1.1–10.3)
PMV BLD AUTO: ABNORMAL FL (ref 8.1–13.5)
POTASSIUM SERPL-SCNC: 3.6 MMOL/L (ref 3.7–5.3)
PROTEIN UA: NEGATIVE
RBC # BLD: 3.64 M/UL (ref 3.95–5.11)
SERUM IFX INTERP: NORMAL
SODIUM BLD-SCNC: 142 MMOL/L (ref 135–144)
SPECIFIC GRAVITY UA: 1.01 (ref 1–1.03)
TURBIDITY: CLEAR
URINE HGB: NEGATIVE
UROBILINOGEN, URINE: NORMAL
WBC # BLD: 4.7 K/UL (ref 3.5–11.3)

## 2020-07-01 PROCEDURE — 81003 URINALYSIS AUTO W/O SCOPE: CPT

## 2020-07-01 PROCEDURE — 6370000000 HC RX 637 (ALT 250 FOR IP): Performed by: NURSE PRACTITIONER

## 2020-07-01 PROCEDURE — 85027 COMPLETE CBC AUTOMATED: CPT

## 2020-07-01 PROCEDURE — 99232 SBSQ HOSP IP/OBS MODERATE 35: CPT | Performed by: PHYSICIAN ASSISTANT

## 2020-07-01 PROCEDURE — 99232 SBSQ HOSP IP/OBS MODERATE 35: CPT | Performed by: INTERNAL MEDICINE

## 2020-07-01 PROCEDURE — 1200000000 HC SEMI PRIVATE

## 2020-07-01 PROCEDURE — 80048 BASIC METABOLIC PNL TOTAL CA: CPT

## 2020-07-01 PROCEDURE — 82947 ASSAY GLUCOSE BLOOD QUANT: CPT

## 2020-07-01 PROCEDURE — 36415 COLL VENOUS BLD VENIPUNCTURE: CPT

## 2020-07-01 PROCEDURE — 2580000003 HC RX 258: Performed by: NURSE PRACTITIONER

## 2020-07-01 PROCEDURE — 85055 RETICULATED PLATELET ASSAY: CPT

## 2020-07-01 PROCEDURE — 2580000003 HC RX 258: Performed by: INTERNAL MEDICINE

## 2020-07-01 PROCEDURE — 2500000003 HC RX 250 WO HCPCS: Performed by: INTERNAL MEDICINE

## 2020-07-01 RX ORDER — SODIUM CHLORIDE 9 MG/ML
INJECTION, SOLUTION INTRAVENOUS CONTINUOUS
Status: DISCONTINUED | OUTPATIENT
Start: 2020-07-01 | End: 2020-07-02 | Stop reason: HOSPADM

## 2020-07-01 RX ADMIN — CYCLOBENZAPRINE 10 MG: 10 TABLET, FILM COATED ORAL at 14:48

## 2020-07-01 RX ADMIN — ACETAMINOPHEN 650 MG: 325 TABLET ORAL at 14:48

## 2020-07-01 RX ADMIN — CYCLOBENZAPRINE 10 MG: 10 TABLET, FILM COATED ORAL at 05:40

## 2020-07-01 RX ADMIN — LEVOTHYROXINE SODIUM 50 MCG: 0.05 TABLET ORAL at 08:22

## 2020-07-01 RX ADMIN — Medication: at 07:32

## 2020-07-01 RX ADMIN — ACETAMINOPHEN 650 MG: 325 TABLET ORAL at 05:40

## 2020-07-01 RX ADMIN — INSULIN LISPRO 1 UNITS: 100 INJECTION, SOLUTION INTRAVENOUS; SUBCUTANEOUS at 21:23

## 2020-07-01 RX ADMIN — SODIUM CHLORIDE: 9 INJECTION, SOLUTION INTRAVENOUS at 10:07

## 2020-07-01 RX ADMIN — SODIUM CHLORIDE, PRESERVATIVE FREE 10 ML: 5 INJECTION INTRAVENOUS at 23:13

## 2020-07-01 ASSESSMENT — PAIN DESCRIPTION - PAIN TYPE
TYPE: CHRONIC PAIN

## 2020-07-01 ASSESSMENT — PAIN SCALES - GENERAL
PAINLEVEL_OUTOF10: 3
PAINLEVEL_OUTOF10: 7
PAINLEVEL_OUTOF10: 3
PAINLEVEL_OUTOF10: 3
PAINLEVEL_OUTOF10: 4
PAINLEVEL_OUTOF10: 8
PAINLEVEL_OUTOF10: 3

## 2020-07-01 ASSESSMENT — PAIN DESCRIPTION - LOCATION
LOCATION: HIP;LEG
LOCATION: LEG;HIP
LOCATION: LEG;HIP
LOCATION: HIP;LEG

## 2020-07-01 NOTE — PLAN OF CARE
Problem: Pain:  Goal: Pain level will decrease  Description: Pain level will decrease  Outcome: Ongoing  Goal: Control of acute pain  Description: Control of acute pain  Outcome: Ongoing  Goal: Control of chronic pain  Description: Control of chronic pain  Outcome: Ongoing     Problem: Serum Glucose Level - Abnormal:  Goal: Ability to maintain appropriate glucose levels will improve  Description: Ability to maintain appropriate glucose levels will improve  Outcome: Ongoing     Problem: Falls - Risk of:  Goal: Will remain free from falls  Description: Will remain free from falls  Outcome: Ongoing  Goal: Absence of physical injury  Description: Absence of physical injury  Outcome: Ongoing

## 2020-07-01 NOTE — PROGRESS NOTES
Tiffany Hung 17    Progress Note    7/1/2020    1:40 PM    Name:   Katharine Menchaca  MRN:     [id number]     Acct:      [de-identified]   Room:   [address] [place] [address] Day:  1  Admit Date:  6/29/2020  8:37 PM    PCP:   TENZIN Trevino CNP  Code Status:  Full Code    Subjective:     C/C:   Chief Complaint   Patient presents with    Back Pain     Interval History Status: improved. Pt with no new complaints. Lying in bed resting upon my evaluation. Creatinine steadily improving. Labs reviewed. Afebrile and hemodynamically stable. Brief History:     69-year-old female with underlying history of type 2 diabetes, chronic kidney disease with baseline creatinine around 1.6, hypothyroidism, hyperlipidemia, epilepsy, gastroparesis, cirrhosis of the liver with portal hypertension, TIPS, underlying psych problems presented with acute kidney injury superimposed on chronic kidney disease and severe right hip pain. Patient has been taking 800 mg of Motrin 2 tablets every night and daytime as well. Found to have creatinine of 3.1. Patient has been complaining of right hip pain and has been worked up for that had x-ray done without much abnormalities. Patient had a kidney ultrasound done which showed medullary nephrocalcinosis. Cortical thinning. MICHELLE thought to be secondary to ischemic ATIN related to NSAID/volume depletion/hypercalcemia. Nephrology consulted. Pt administered fluids and creatinine continues to improve. Review of Systems:     Constitutional:  negative for chills, fevers, sweats  Respiratory:  negative for cough, dyspnea on exertion, shortness of breath, wheezing  Cardiovascular:  negative for chest pain, chest pressure/discomfort, lower extremity edema, palpitations  Gastrointestinal:  negative for abdominal pain, constipation, diarrhea, nausea, vomiting  Neurological:  negative for dizziness, headache    Medications:      Allergies: No Known Allergies    Current Meds:   Scheduled Meds:    levothyroxine  50 mcg Oral Daily    lidocaine  1 patch Transdermal Daily    midodrine  5 mg Oral Daily    sodium chloride flush  10 mL Intravenous 2 times per day    heparin (porcine)  5,000 Units Subcutaneous 3 times per day    insulin lispro  0-6 Units Subcutaneous TID     insulin lispro  0-3 Units Subcutaneous Nightly     Continuous Infusions:    sodium chloride 100 mL/hr at 20 1007    dextrose       PRN Meds: cyclobenzaprine, sodium chloride flush, acetaminophen **OR** acetaminophen, promethazine **OR** ondansetron, nicotine, glucose, dextrose, glucagon (rDNA), dextrose    Data:     Past Medical History:   has a past medical history of Chronic back pain, Chronic kidney disease, Depression, Fatty liver, Gastroparesis, Generalized epilepsy ([address] Hyperlipidemia, Hypothyroidism, Migraine without aura, Neuropathy, Osteoarthritis, Pancreas cyst, Pancreatitis, Sepsis ([address] and Tic disorder. Social History:   reports that she has been smoking cigarettes. She started smoking about 5 years ago. She has a 31.00 pack-year smoking history. She has never used smokeless tobacco. She reports current drug use. Drug: Marijuana. She reports that she does not drink alcohol. Family History:   Family History   Problem Relation Age of Onset    Diabetes Father     Coronary Art Dis Father     Stroke Father     Other Father     COPD Mother     Heart Disease Mother     Diabetes Mother     Arthritis Mother     High Cholesterol Mother        Vitals:  /73   Pulse 81   Temp 97.5 °F (36.4 °C) (Oral)   Resp 16   Ht 5' 3\" (1.6 m)   Wt 102 lb (46.3 kg)   SpO2 100%   BMI 18.07 kg/m²   Temp (24hrs), Av.5 °F (36.4 °C), Min:97.5 °F (36.4 °C), Max:97.5 °F (36.4 °C)    Recent Labs     20  1643 20  0726 20  1118   POCGLU 101 186* 123* 105       I/O (24Hr):     Intake/Output Summary (Last 24 hours) at 2020 [facility] filed at 7/1/2020 0930  Gross per 24 hour   Intake --   Output 4375 ml   Net -4375 ml       Labs:  Hematology:  Recent Labs     06/29/20 2106 06/30/20  0556 07/01/20  0600   WBC 9.7 7.4 4.7   RBC 4.49 3.84* 3.64*   HGB 12.5 10.8* 10.4*   HCT 40.5 35.4* 32.7*   MCV 90.2 92.2 89.8   MCH 27.8 28.1 28.6   MCHC 30.9 30.5 31.8   RDW 12.8 12.8 13.1    134* See Reflexed IPF Result   MPV 11.5 11.3 NOT REPORTED   SEDRATE 52*  --   --    CRP 0.7  --   --    INR  --  1.0  --      Chemistry:  Recent Labs     06/29/20 2106 06/30/20  0556 07/01/20  0600   * 141 142   K 4.8 4.2 3.6*   CL 98 106 109*   CO2 20 18* 21   GLUCOSE 107* 157* 162*   BUN 56* 58* 46*   CREATININE 2.96* 3.18* 2.62*   MG  --  2.0  --    ANIONGAP 16 17 12   LABGLOM 17* 16* 20*   GFRAA 21* 19* 24*   CALCIUM 11.6* 9.4 7.6*     Recent Labs     06/29/20  0930  06/30/20  0556 06/30/20  0733 06/30/20  1058 06/30/20  1643 06/30/20 2017 07/01/20  0726 07/01/20  1118   PROT 8.1  --  6.1*  --   --   --   --   --   --    LABALBU 4.5  --  3.5  --   --   --   --   --   --    LABA1C 8.5*  --   --   --   --   --   --   --   --    TSH 1.81  --   --   --   --   --   --   --   --    AST 10  --  11  --   --   --   --   --   --    ALT 10  --  7  --   --   --   --   --   --    ALKPHOS 99  --  72  --   --   --   --   --   --    BILITOT 0.25*  --  0.19*  --   --   --   --   --   --    CHOL 184  --   --   --   --   --   --   --   --    HDL 52  --   --   --   --   --   --   --   --    LDLCHOLESTEROL 106  --   --   --   --   --   --   --   --    CHOLHDLRATIO 3.5  --   --   --   --   --   --   --   --    TRIG 129  --   --   --   --   --   --   --   --    VLDL NOT REPORTED  --   --   --   --   --   --   --   --    POCGLU  --    < >  --  146* 264* 101 186* 123* 105    < > = values in this interval not displayed.      ABG:No results found for: POCPH, PHART, PH, POCPCO2, PZJ6SEI, PCO2, POCPO2, PO2ART, PO2, POCHCO3, SXH3DTU, HCO3, NBEA, PBEA, BEART, BE, THGBART, THB, CUI5XFO, CXSD3DDL, K5AOWBOV, O2SAT, FIO2  Lab Results   Component Value Date/Time    SPECIAL NOT REPORTED 01/27/2015 12:05 PM     Lab Results   Component Value Date/Time    CULTURE NO GROWTH 01/27/2015 12:05 PM    CULTURE  01/27/2015 12:05 PM     Performed at [facility], [address] [place] [address] [phone]       Radiology:  Ct Abdomen Pelvis Wo Contrast Additional Contrast? None    Result Date: 6/30/2020  1. Interval TIPS procedure with the stent noted in the posterior right lobe of liver. 2. Interval development of bilateral medullary nephrocalcinosis. 3. Interval development of numerous pancreatic calcifications consistent with chronic pancreatitis. Previous pancreatic and peripancreatic a cystic collections no longer appreciated. 4. Grade 1 L4-5 anterolisthesis. Us Retroperitoneal Limited    Result Date: 6/29/2020  1. Bilateral renal calculi. No hydronephrosis. 2. Atrophy and thinning of the bilateral renal cortex above consistent with chronic medical renal disease.        Physical Examination:        General appearance:  alert, cooperative and no distress  Mental Status:  oriented to person, place and time and normal affect  Lungs:  clear to auscultation bilaterally, normal effort  Heart:  regular rate and rhythm, no murmur  Abdomen:  soft, nontender, nondistended, normal bowel sounds, no masses, hepatomegaly, splenomegaly  Extremities:  no edema, redness, tenderness in the calves  Skin:  no gross lesions, rashes, induration    Assessment:        Hospital Problems           Last Modified POA    * (Principal) Acute kidney injury superimposed on chronic kidney disease ([address] 6/30/2020 Yes    Chronic pancreatitis ([address] 6/30/2020 Yes    Controlled type 2 diabetes mellitus with stage 3 chronic kidney disease, with long-term current use of insulin ([address] 6/30/2020 Yes    Alcohol-induced chronic pancreatitis ([address] 6/30/2020 Yes    Cirrhosis of liver ([address] 6/30/2020 Yes    MICHELLE (acute kidney injury) ([facility] 6/29/2020 Yes    Right hip pain 6/30/2020 Yes    Acute kidney injury (MICHELLE) with acute tubular necrosis (ATN) ([facility] 6/30/2020 Yes    S/P TIPS (transjugular intrahepatic portosystemic shunt) 6/30/2020 Yes          Plan:        1. Continue IV fluids per nephrology  2. Monitor creatinine  3. Pain control  4. Heparin DVT prophylaxis  5.  Anticipate discharge tomorrow    Leroy Arzate PA-C  7/1/2020  1:40 PM

## 2020-07-01 NOTE — PROGRESS NOTES
NEPHROLOGY PROGRESS NOTE      SUBJECTIVE     On IV fluids containing bicarb at 150 cc an hour. Excellent diuresis. Renal function showing signs of improvement. Serologies so far unremarkable. Tolerating intake well. No shortness of breath. OBJECTIVE     Vitals:    06/29/20 2245 06/30/20 0818 06/30/20 2000 07/01/20 0835   BP: 109/65 117/70 106/79 109/73   Pulse: 78 72 81 81   Resp: 16 16 15 16   Temp: 97.9 °F (36.6 °C) 97.7 °F (36.5 °C) 97.5 °F (36.4 °C) 97.5 °F (36.4 °C)   TempSrc: Oral Oral Oral Oral   SpO2: 100% 100% 100% 100%   Weight:       Height:         24HR INTAKE/OUTPUT:      Intake/Output Summary (Last 24 hours) at 7/1/2020 0840  Last data filed at 7/1/2020 0543  Gross per 24 hour   Intake --   Output 3525 ml   Net -3525 ml       General appearance:Awake, alert, in no acute distress  HEENT: PERRLA  Respiratory::vesicular breath sounds,no wheeze/crackles  Cardiovascular:S1 S2 normal,no gallop or organic murmur. Abdomen:Non tender/non distended. Bowel sounds present  Extremities: No Cyanosis or Clubbing,Lower extremity edema  Neurological:Alert and oriented. No abnormalities of mood, affect, memory, mentation, or behavior are noted      MEDICATIONS     Scheduled Meds:    levothyroxine  50 mcg Oral Daily    lidocaine  1 patch Transdermal Daily    midodrine  5 mg Oral Daily    sodium chloride flush  10 mL Intravenous 2 times per day    heparin (porcine)  5,000 Units Subcutaneous 3 times per day    insulin lispro  0-6 Units Subcutaneous TID     insulin lispro  0-3 Units Subcutaneous Nightly     Continuous Infusions:    sodium chloride      dextrose       PRN Meds:  cyclobenzaprine, sodium chloride flush, acetaminophen **OR** acetaminophen, promethazine **OR** ondansetron, nicotine, glucose, dextrose, glucagon (rDNA), dextrose  Home Meds:                Medications Prior to Admission: methylPREDNISolone (MEDROL, ROMEL,) 4 MG tablet, Take by mouth as instructed by packet.   tiZANidine (ZANAFLEX) 2 MG tablet, Take 1 tablet by mouth every 8 hours as needed (back pain)  acetaminophen-codeine (TYLENOL/CODEINE #3) 300-30 MG per tablet, Take 1 tablet by mouth every 4 hours as needed for Pain for up to 7 days. Intended supply: 7 days. Take lowest dose possible to manage pain  levothyroxine (SYNTHROID) 50 MCG tablet, Take 1 tablet by mouth Daily  midodrine (PROAMATINE) 5 MG tablet, Take 1 tablet by mouth daily Do not give after 1800 or within 4 hrs of bedtime. blood glucose test strips (FREESTYLE LITE) strip, 1 each by Other route 3 times daily  blood glucose test strips (ONE TOUCH ULTRA TEST) strip, 1 each by In Vitro route daily  Multiple Vitamins-Minerals (MULTIVITAMIN PO), Take  by mouth. INVESTIGATIONS     Last 3 CMP:    Recent Labs     06/29/20  0930 06/29/20 2106 06/30/20  0556 07/01/20  0600    134* 141 142   K 4.7 4.8 4.2 3.6*   CL 96* 98 106 109*   CO2 21 20 18* 21   BUN 58* 56* 58* 46*   CREATININE 3.12* 2.96* 3.18* 2.62*   CALCIUM 12.2* 11.6* 9.4 7.6*   PROT 8.1  --  6.1*  --    LABALBU 4.5  --  3.5  --    BILITOT 0.25*  --  0.19*  --    ALKPHOS 99  --  72  --    AST 10  --  11  --    ALT 10  --  7  --        Last 3 CBC:  Recent Labs     06/29/20 2106 06/30/20  0556 07/01/20  0600   WBC 9.7 7.4 4.7   RBC 4.49 3.84* 3.64*   HGB 12.5 10.8* 10.4*   HCT 40.5 35.4* 32.7*   MCV 90.2 92.2 89.8   MCH 27.8 28.1 28.6   MCHC 30.9 30.5 31.8   RDW 12.8 12.8 13.1    134* See Reflexed IPF Result   MPV 11.5 11.3 NOT REPORTED       ASSESSMENT     1. Acute kidney injury nonoliguric secondary to ischemic ATIN related to NSAID/volume depletion/hypercalcemia.-Improving. Creatinine peaked to 3.1  Serologies unremarkable. Negligible RBC in urine. Doubt GN from NSAID  2. Hypercalcemia secondary to volume depletion -improved with hydration -improved  3. Metabolic acidosis secondary to acute kidney injury -improved  4.   Chronic kidney disease stage III secondary to ischemic nephrosclerosis with baseline creatinine 1.4-1.6. Ultrasound shows bilateral small kidneys. 5.  History of alcohol cirrhosis of liver  6. History of chronic pancreatitis  7. Chronic alcohol use  8. Type 2 diabetes    PLAN     1. Switch to isotonic saline  2. Follow-up pending serologies  3.   Okay to discharge a.m. if renal function tomorrow stable/improving    Please do not hesitate to call with questions    This note is created with the assistance of a speech-recognition program. While intending to generate a document that actually reflects the content of the visit, no guarantees can be provided that every mistake has been identified and corrected by editing    Adry Raman MD, MRCP Chris Massey   7/1/2020 8:40 AM  [facility]

## 2020-07-02 VITALS
WEIGHT: 102 LBS | BODY MASS INDEX: 18.07 KG/M2 | SYSTOLIC BLOOD PRESSURE: 135 MMHG | DIASTOLIC BLOOD PRESSURE: 88 MMHG | HEART RATE: 81 BPM | TEMPERATURE: 98.1 F | OXYGEN SATURATION: 100 % | RESPIRATION RATE: 16 BRPM | HEIGHT: 63 IN

## 2020-07-02 PROBLEM — N18.9 ACUTE KIDNEY INJURY SUPERIMPOSED ON CHRONIC KIDNEY DISEASE (HCC): Status: RESOLVED | Noted: 2020-06-29 | Resolved: 2020-07-02

## 2020-07-02 PROBLEM — N17.9 AKI (ACUTE KIDNEY INJURY) (HCC): Status: RESOLVED | Noted: 2020-06-29 | Resolved: 2020-07-02

## 2020-07-02 PROBLEM — N17.0 ACUTE KIDNEY INJURY (AKI) WITH ACUTE TUBULAR NECROSIS (ATN) (HCC): Status: RESOLVED | Noted: 2020-06-30 | Resolved: 2020-07-02

## 2020-07-02 PROBLEM — N17.9 ACUTE KIDNEY INJURY SUPERIMPOSED ON CHRONIC KIDNEY DISEASE (HCC): Status: RESOLVED | Noted: 2020-06-29 | Resolved: 2020-07-02

## 2020-07-02 LAB
ALBUMIN SERPL-MCNC: 3.4 G/DL (ref 3.5–5.2)
ALBUMIN/GLOBULIN RATIO: 1.4 (ref 1–2.5)
ALP BLD-CCNC: 61 U/L (ref 35–104)
ALT SERPL-CCNC: 6 U/L (ref 5–33)
ANION GAP SERPL CALCULATED.3IONS-SCNC: 9 MMOL/L (ref 9–17)
AST SERPL-CCNC: 12 U/L
BILIRUB SERPL-MCNC: 0.17 MG/DL (ref 0.3–1.2)
BUN BLDV-MCNC: 30 MG/DL (ref 6–20)
BUN/CREAT BLD: ABNORMAL (ref 9–20)
CALCIUM SERPL-MCNC: 7.3 MG/DL (ref 8.6–10.4)
CHLORIDE BLD-SCNC: 112 MMOL/L (ref 98–107)
CO2: 22 MMOL/L (ref 20–31)
CREAT SERPL-MCNC: 2.01 MG/DL (ref 0.5–0.9)
GFR AFRICAN AMERICAN: 32 ML/MIN
GFR NON-AFRICAN AMERICAN: 27 ML/MIN
GFR SERPL CREATININE-BSD FRML MDRD: ABNORMAL ML/MIN/{1.73_M2}
GFR SERPL CREATININE-BSD FRML MDRD: ABNORMAL ML/MIN/{1.73_M2}
GLUCOSE BLD-MCNC: 100 MG/DL (ref 65–105)
GLUCOSE BLD-MCNC: 105 MG/DL (ref 70–99)
HCT VFR BLD CALC: 31.2 % (ref 36.3–47.1)
HEMOGLOBIN: 9.7 G/DL (ref 11.9–15.1)
MCH RBC QN AUTO: 28.3 PG (ref 25.2–33.5)
MCHC RBC AUTO-ENTMCNC: 31.1 G/DL (ref 28.4–34.8)
MCV RBC AUTO: 91 FL (ref 82.6–102.9)
NRBC AUTOMATED: 0 PER 100 WBC
PDW BLD-RTO: 12.9 % (ref 11.8–14.4)
PLATELET # BLD: ABNORMAL K/UL (ref 138–453)
PLATELET, FLUORESCENCE: 80 K/UL (ref 138–453)
PLATELET, IMMATURE FRACTION: 3 % (ref 1.1–10.3)
PMV BLD AUTO: ABNORMAL FL (ref 8.1–13.5)
POTASSIUM SERPL-SCNC: 3.7 MMOL/L (ref 3.7–5.3)
RBC # BLD: 3.43 M/UL (ref 3.95–5.11)
SODIUM BLD-SCNC: 143 MMOL/L (ref 135–144)
TOTAL PROTEIN: 5.8 G/DL (ref 6.4–8.3)
WBC # BLD: 3.7 K/UL (ref 3.5–11.3)

## 2020-07-02 PROCEDURE — 36415 COLL VENOUS BLD VENIPUNCTURE: CPT

## 2020-07-02 PROCEDURE — 99239 HOSP IP/OBS DSCHRG MGMT >30: CPT | Performed by: PHYSICIAN ASSISTANT

## 2020-07-02 PROCEDURE — 99232 SBSQ HOSP IP/OBS MODERATE 35: CPT | Performed by: INTERNAL MEDICINE

## 2020-07-02 PROCEDURE — 6370000000 HC RX 637 (ALT 250 FOR IP): Performed by: NURSE PRACTITIONER

## 2020-07-02 PROCEDURE — 82947 ASSAY GLUCOSE BLOOD QUANT: CPT

## 2020-07-02 PROCEDURE — 85055 RETICULATED PLATELET ASSAY: CPT

## 2020-07-02 PROCEDURE — 85027 COMPLETE CBC AUTOMATED: CPT

## 2020-07-02 PROCEDURE — 80053 COMPREHEN METABOLIC PANEL: CPT

## 2020-07-02 RX ORDER — LIDOCAINE 50 MG/G
1 PATCH TOPICAL DAILY
Qty: 10 PATCH | Refills: 0 | Status: SHIPPED | OUTPATIENT
Start: 2020-07-02 | End: 2020-07-12

## 2020-07-02 RX ADMIN — CYCLOBENZAPRINE 10 MG: 10 TABLET, FILM COATED ORAL at 02:09

## 2020-07-02 RX ADMIN — ACETAMINOPHEN 650 MG: 325 TABLET ORAL at 02:09

## 2020-07-02 RX ADMIN — LEVOTHYROXINE SODIUM 50 MCG: 0.05 TABLET ORAL at 06:46

## 2020-07-02 ASSESSMENT — PAIN SCALES - GENERAL
PAINLEVEL_OUTOF10: 4
PAINLEVEL_OUTOF10: 3
PAINLEVEL_OUTOF10: 8

## 2020-07-02 ASSESSMENT — PAIN DESCRIPTION - PAIN TYPE: TYPE: CHRONIC PAIN

## 2020-07-02 ASSESSMENT — PAIN DESCRIPTION - LOCATION: LOCATION: ARM;LEG

## 2020-07-02 NOTE — DISCHARGE SUMMARY
Tiffany Hung 17    Discharge Summary     Patient ID: Alcira Clemens  :  1975   MRN: [id number]     ACCOUNT:  [de-identified]   Patient's PCP: TENZIN Garrison CNP  Admit Date: 2020   Discharge Date: 2020    Length of Stay: 2  Code Status:  Full Code  Admitting Physician: Benedicto Carbajal MD  Discharge Physician: Darryle Brier, PA-C     Active Discharge Diagnoses:     Hospital Problem Lists:  Principal Problem (Resolved):    Acute kidney injury superimposed on chronic kidney disease ([address]  Active Problems:    Chronic pancreatitis ([address]    Controlled type 2 diabetes mellitus with stage 3 chronic kidney disease, with long-term current use of insulin ([address]    Alcohol-induced chronic pancreatitis ([address]    Cirrhosis of liver ([address]    Right hip pain    S/P TIPS (transjugular intrahepatic portosystemic shunt)  Resolved Problems:    MICHELLE (acute kidney injury) ([address]    Acute kidney injury (MICHELLE) with acute tubular necrosis (ATN) ([address]      Admission Condition:  fair     Discharged Condition: good    Hospital Stay:     Hospital Course:      40-year-old female with underlying history of type 2 diabetes, chronic kidney disease with baseline creatinine around 1.6, hypothyroidism, hyperlipidemia, epilepsy, gastroparesis, cirrhosis of the liver with portal hypertension, TIPS, underlying psych problems presented with acute kidney injury superimposed on chronic kidney disease and severe right hip pain.  Patient has been taking 800 mg of Motrin 2 tablets every night and daytime as well.  Found to have creatinine of 3.1. Patient has been complaining of right hip pain and has been worked up for that had x-ray done without much abnormalities. Rohit Leahy had a kidney ultrasound done which showed medullary nephrocalcinosis.  Cortical thinning. MICHELLE thought to be secondary to ischemic ATIN related to NSAID/volume depletion/hypercalcemia.  Nephrology consulted. Pt administered fluids and creatinine continues to improve. At the time of discharge, her creatinine was 2.01, consistent with her baseline. She will follow-up with her nephrologist, Dr. Linda Alexandra, as an outpatient. Repeat BMP in one week. Significant therapeutic interventions: See above.     Significant Diagnostic Studies:   Labs / Micro:  CBC:   Lab Results   Component Value Date    WBC 3.7 07/02/2020    RBC 3.43 07/02/2020    RBC 4.44 01/19/2019    HGB 9.7 07/02/2020    HCT 31.2 07/02/2020    MCV 91.0 07/02/2020    MCH 28.3 07/02/2020    MCHC 31.1 07/02/2020    RDW 12.9 07/02/2020    PLT See Reflexed IPF Result 07/02/2020     05/10/2012     BMP:    Lab Results   Component Value Date    GLUCOSE 105 07/02/2020    GLUCOSE 123 01/19/2019     07/02/2020    K 3.7 07/02/2020     07/02/2020    CO2 22 07/02/2020    ANIONGAP 9 07/02/2020    BUN 30 07/02/2020    CREATININE 2.01 07/02/2020    BUNCRER NOT REPORTED 07/02/2020    CALCIUM 7.3 07/02/2020    LABGLOM 27 07/02/2020    GFRAA 32 07/02/2020    GFR      07/02/2020    GFR NOT REPORTED 07/02/2020     HFP:    Lab Results   Component Value Date    PROT 5.8 07/02/2020    PROT 7.7 01/19/2019     CMP:    Lab Results   Component Value Date    GLUCOSE 105 07/02/2020    GLUCOSE 123 01/19/2019     07/02/2020    K 3.7 07/02/2020     07/02/2020    CO2 22 07/02/2020    BUN 30 07/02/2020    CREATININE 2.01 07/02/2020    ANIONGAP 9 07/02/2020    ALKPHOS 61 07/02/2020    ALT 6 07/02/2020    AST 12 07/02/2020    BILITOT 0.17 07/02/2020    LABALBU 3.4 07/02/2020    LABALBU 4.7 01/19/2019    ALBUMIN 1.4 07/02/2020    LABGLOM 27 07/02/2020    GFRAA 32 07/02/2020    GFR      07/02/2020    GFR NOT REPORTED 07/02/2020    PROT 5.8 07/02/2020    PROT 7.7 01/19/2019    CALCIUM 7.3 07/02/2020     PT/INR:    Lab Results   Component Value Date    PROTIME 10.7 06/30/2020    PROTIME 14.0 11/07/2017    PROTIME 12.2 04/09/2012    INR 1.0 06/30/2020     PTT:   Lab Results   Component Value Date    APTT 27.2 02/14/2020    APTT 33.5 11/07/2017     FLP:    Lab Results   Component Value Date    CHOL 184 06/29/2020    CHOL 202 07/06/2016    CHOL 202 07/06/2016    TRIG 129 06/29/2020    HDL 52 06/29/2020     U/A:    Lab Results   Component Value Date    COLORU YELLOW 07/01/2020    TURBIDITY CLEAR 07/01/2020    SPECGRAV 1.009 07/01/2020    HGBUR NEGATIVE 07/01/2020    PHUR 6.0 07/01/2020    PROTEINU NEGATIVE 07/01/2020    GLUCOSEU NEGATIVE 07/01/2020    GLUCOSEU NEGATIVE 05/08/2012    KETUA NEGATIVE 07/01/2020    BILIRUBINUR NEGATIVE 07/01/2020    BILIRUBINUR negative 01/27/2015    BILIRUBINUR NEGATIVE  Verified by ictotest. 05/08/2012    UROBILINOGEN Normal 07/01/2020    NITRU NEGATIVE 07/01/2020    LEUKOCYTESUR NEGATIVE 07/01/2020     TSH:    Lab Results   Component Value Date    TSH 1.81 06/29/2020     Radiology:  Ct Abdomen Pelvis Wo Contrast Additional Contrast? None    Result Date: 6/30/2020  1. Interval TIPS procedure with the stent noted in the posterior right lobe of liver. 2. Interval development of bilateral medullary nephrocalcinosis. 3. Interval development of numerous pancreatic calcifications consistent with chronic pancreatitis. Previous pancreatic and peripancreatic a cystic collections no longer appreciated. 4. Grade 1 L4-5 anterolisthesis. Us Retroperitoneal Limited    Result Date: 6/29/2020  1. Bilateral renal calculi. No hydronephrosis. 2. Atrophy and thinning of the bilateral renal cortex above consistent with chronic medical renal disease. Consultations:    Consults:     Final Specialist Recommendations/Findings:   IP CONSULT TO HOSPITALIST  IP CONSULT TO NEPHROLOGY      The patient was seen and examined on day of discharge and this discharge summary is in conjunction with any daily progress note from day of discharge.     Discharge plan:     Disposition: Home    Physician Follow Up:   German Brothers, APRN - CNP  [address] [facility] [phone]          Requiring Further Evaluation/Follow Up POST HOSPITALIZATION/Incidental Findings:   - Repeat BMP in one week  - follow-up with nephrology    Diet: renal diet    Activity: As tolerated    Discharge Medications:      Medication List      START taking these medications    lidocaine 5 %  Commonly known as:  LIDODERM  Place 1 patch onto the skin daily for 10 days 12 hours on, 12 hours off. CONTINUE taking these medications    acetaminophen-codeine 300-30 MG per tablet  Commonly known as:  TYLENOL/CODEINE #3  Take 1 tablet by mouth every 4 hours as needed for Pain for up to 7 days. Intended supply: 7 days. Take lowest dose possible to manage pain     * blood glucose test strips strip  Commonly known as:  ONE TOUCH ULTRA TEST  1 each by In Vitro route daily     * blood glucose test strips strip  Commonly known as:  FREESTYLE LITE  1 each by Other route 3 times daily     levothyroxine 50 MCG tablet  Commonly known as:  SYNTHROID  Take 1 tablet by mouth Daily     methylPREDNISolone 4 MG tablet  Commonly known as:  MEDROL (ROMEL)  Take by mouth as instructed by packet. midodrine 5 MG tablet  Commonly known as:  PROAMATINE  Take 1 tablet by mouth daily Do not give after 1800 or within 4 hrs of bedtime. MULTIVITAMIN PO     tiZANidine 2 MG tablet  Commonly known as:  ZANAFLEX  Take 1 tablet by mouth every 8 hours as needed (back pain)         * This list has 2 medication(s) that are the same as other medications prescribed for you. Read the directions carefully, and ask your doctor or other care provider to review them with you.                Where to Get Your Medications      These medications were sent to [address], [address] [place] [id number]    Phone:  [phone]   · lidocaine 5 %         Discharge Procedure Orders   Basic Metabolic Panel   Standing Status: Future Standing Exp. Date: 07/06/20   Order Comments: Fax to [phone]. Dr Christiano Wells Panel   Standing Status: Future Standing Exp. Date: 07/02/21   Order Comments: PLEASE FORWARD RESULTS TO DR. ISAIAH SOSA       Time Spent on discharge is  37 mins in patient examination, evaluation, counseling as well as medication reconciliation, prescriptions for required medications, discharge plan and follow up. Electronically signed by   Sabrina Giordano PA-C  7/2/2020  10:06 AM      Thank you TENZIN Lacy - CIARA for the opportunity to be involved in this patient's care.

## 2020-07-02 NOTE — CARE COORDINATION
Discharge [facility] Case Management Department  Written by: Shantell Weems RN    Patient Name: Gina Childress  Attending Provider: Shruti Toscano MD  Admit Date: 2020  8:37 PM  MRN: [id number]  Account: [de-identified]                     : 1975  Discharge Date: 2020      Disposition: home independent. No HC needs.      Shantell Weems RN

## 2020-07-02 NOTE — PROGRESS NOTES
NEPHROLOGY PROGRESS NOTE      SUBJECTIVE     On isotonic fluids. Urine output decent. Blood pressure stable. Overall feels fine. Caitlin Nap to go home. No chest pain shortness of breath. OBJECTIVE     Vitals:    06/30/20 2000 07/01/20 0835 07/01/20 2115 07/02/20 0805   BP: 106/79 109/73 120/88 135/88   Pulse: 81 81 90 81   Resp: 15 16 14 16   Temp: 97.5 °F (36.4 °C) 97.5 °F (36.4 °C) 98.5 °F (36.9 °C) 98.1 °F (36.7 °C)   TempSrc: Oral Oral Oral Oral   SpO2: 100% 100% 100% 100%   Weight:       Height:         24HR INTAKE/OUTPUT:      Intake/Output Summary (Last 24 hours) at 7/2/2020 0949  Last data filed at 7/1/2020 1800  Gross per 24 hour   Intake --   Output 350 ml   Net -350 ml       General appearance:Awake, alert, in no acute distress  HEENT: PERRLA  Respiratory::vesicular breath sounds,no wheeze/crackles  Cardiovascular:S1 S2 normal,no gallop or organic murmur. Abdomen:Non tender/non distended. Bowel sounds present  Extremities: No Cyanosis or Clubbing,Lower extremity edema  Neurological:Alert and oriented. No abnormalities of mood, affect, memory, mentation, or behavior are noted      MEDICATIONS     Scheduled Meds:    levothyroxine  50 mcg Oral Daily    lidocaine  1 patch Transdermal Daily    midodrine  5 mg Oral Daily    sodium chloride flush  10 mL Intravenous 2 times per day    heparin (porcine)  5,000 Units Subcutaneous 3 times per day    insulin lispro  0-6 Units Subcutaneous TID WC    insulin lispro  0-3 Units Subcutaneous Nightly     Continuous Infusions:    sodium chloride 100 mL/hr at 07/01/20 1007    dextrose       PRN Meds:  cyclobenzaprine, sodium chloride flush, acetaminophen **OR** acetaminophen, promethazine **OR** ondansetron, nicotine, glucose, dextrose, glucagon (rDNA), dextrose  Home Meds:                Medications Prior to Admission: methylPREDNISolone (MEDROL, ROMEL,) 4 MG tablet, Take by mouth as instructed by packet.   tiZANidine (ZANAFLEX) 2 MG tablet, Take 1 tablet by mouth every 8 hours as needed (back pain)  acetaminophen-codeine (TYLENOL/CODEINE #3) 300-30 MG per tablet, Take 1 tablet by mouth every 4 hours as needed for Pain for up to 7 days. Intended supply: 7 days. Take lowest dose possible to manage pain  levothyroxine (SYNTHROID) 50 MCG tablet, Take 1 tablet by mouth Daily  midodrine (PROAMATINE) 5 MG tablet, Take 1 tablet by mouth daily Do not give after 1800 or within 4 hrs of bedtime. blood glucose test strips (FREESTYLE LITE) strip, 1 each by Other route 3 times daily  blood glucose test strips (ONE TOUCH ULTRA TEST) strip, 1 each by In Vitro route daily  Multiple Vitamins-Minerals (MULTIVITAMIN PO), Take  by mouth. INVESTIGATIONS     Last 3 CMP:    Recent Labs     06/30/20  0556 07/01/20  0600 07/02/20  0709    142 143   K 4.2 3.6* 3.7    109* 112*   CO2 18* 21 22   BUN 58* 46* 30*   CREATININE 3.18* 2.62* 2.01*   CALCIUM 9.4 7.6* 7.3*   PROT 6.1*  --  5.8*   LABALBU 3.5  --  3.4*   BILITOT 0.19*  --  0.17*   ALKPHOS 72  --  61   AST 11  --  12   ALT 7  --  6       Last 3 CBC:  Recent Labs     06/30/20  0556 07/01/20  0600 07/02/20  0709   WBC 7.4 4.7 3.7   RBC 3.84* 3.64* 3.43*   HGB 10.8* 10.4* 9.7*   HCT 35.4* 32.7* 31.2*   MCV 92.2 89.8 91.0   MCH 28.1 28.6 28.3   MCHC 30.5 31.8 31.1   RDW 12.8 13.1 12.9   * See Reflexed IPF Result See Reflexed IPF Result   MPV 11.3 NOT REPORTED NOT REPORTED       ASSESSMENT     1. Acute kidney injury nonoliguric secondary to ischemic ATIN related to NSAID/volume depletion/hypercalcemia.-Improving. Creatinine peaked to 3.1  Serologies unremarkable. Negligible RBC in urine. Doubt GN from NSAID  2. Hypercalcemia secondary to volume depletion -improved with hydration -improved  3. Metabolic acidosis secondary to acute kidney injury -improved  4. Chronic kidney disease stage III secondary to ischemic nephrosclerosis with baseline creatinine 1.4-1.6. Ultrasound shows bilateral small kidneys.   5.  History of alcohol cirrhosis of liver  6. History of chronic pancreatitis  7. Chronic alcohol use  8. Type 2 diabetes    PLAN     1. Encourage oral hydration. DC IV fluids. 2.  Outpatient BMP as ordered  3. Follow-up with me in 4 to 6 weeks.   Call [phone] for appointment    Please do not hesitate to call with questions    This note is created with the assistance of a speech-recognition program. While intending to generate a document that actually reflects the content of the visit, no guarantees can be provided that every mistake has been identified and corrected by editing    Claire Garrison MD, [facility] ), [address]   7/2/2020 9:49 AM  [facility]

## 2020-07-02 NOTE — PROGRESS NOTES
Tiffany Hung 17    Progress Note    7/2/2020    10:11 AM    Name:   Mike Elena  MRN:     [id number]     Acct:      [de-identified]   Room:   [address] [place] [address] Day:  2  Admit Date:  6/29/2020  8:37 PM    PCP:   TENZIN Keys CNP  Code Status:  Full Code    Subjective:     C/C:   Chief Complaint   Patient presents with    Back Pain     Interval History Status: improved. Pt with no new complaints. Creatinine improved and consistent with baseline. She is afebrile and hemodynamically stable. She is ready to go home. Brief History:     68-year-old female with underlying history of type 2 diabetes, chronic kidney disease with baseline creatinine around 1.6, hypothyroidism, hyperlipidemia, epilepsy, gastroparesis, cirrhosis of the liver with portal hypertension, TIPS, underlying psych problems presented with acute kidney injury superimposed on chronic kidney disease and severe right hip pain. Patient has been taking 800 mg of Motrin 2 tablets every night and daytime as well. Found to have creatinine of 3.1. Patient has been complaining of right hip pain and has been worked up for that had x-ray done without much abnormalities. Patient had a kidney ultrasound done which showed medullary nephrocalcinosis. Cortical thinning. MICHELLE thought to be secondary to ischemic ATIN related to NSAID/volume depletion/hypercalcemia. Nephrology consulted. Pt administered fluids and creatinine continues to improve. Review of Systems:     Constitutional:  negative for chills, fevers, sweats  Respiratory:  negative for cough, dyspnea on exertion, shortness of breath, wheezing  Cardiovascular:  negative for chest pain, chest pressure/discomfort, lower extremity edema, palpitations  Gastrointestinal:  negative for abdominal pain, constipation, diarrhea, nausea, vomiting  Neurological:  negative for dizziness, headache    Medications:      Allergies:  No 1011  Last data filed at 7/1/2020 1800  Gross per 24 hour   Intake --   Output 350 ml   Net -350 ml       Labs:  Hematology:  Recent Labs     06/29/20  2106 06/30/20  0556 07/01/20  0600 07/02/20  0709   WBC 9.7 7.4 4.7 3.7   RBC 4.49 3.84* 3.64* 3.43*   HGB 12.5 10.8* 10.4* 9.7*   HCT 40.5 35.4* 32.7* 31.2*   MCV 90.2 92.2 89.8 91.0   MCH 27.8 28.1 28.6 28.3   MCHC 30.9 30.5 31.8 31.1   RDW 12.8 12.8 13.1 12.9    134* See Reflexed IPF Result See Reflexed IPF Result   MPV 11.5 11.3 NOT REPORTED NOT REPORTED   SEDRATE 52*  --   --   --    CRP 0.7  --   --   --    INR  --  1.0  --   --      Chemistry:  Recent Labs     06/30/20  0556 07/01/20  0600 07/02/20  0709    142 143   K 4.2 3.6* 3.7    109* 112*   CO2 18* 21 22   GLUCOSE 157* 162* 105*   BUN 58* 46* 30*   CREATININE 3.18* 2.62* 2.01*   MG 2.0  --   --    ANIONGAP 17 12 9   LABGLOM 16* 20* 27*   GFRAA 19* 24* 32*   CALCIUM 9.4 7.6* 7.3*     Recent Labs     06/30/20  0556  06/30/20 2017 07/01/20  0726 07/01/20  1118 07/01/20  1536 07/01/20 2122 07/02/20  0709 07/02/20  0726   PROT 6.1*  --   --   --   --   --   --  5.8*  --    LABALBU 3.5  --   --   --   --   --   --  3.4*  --    AST 11  --   --   --   --   --   --  12  --    ALT 7  --   --   --   --   --   --  6  --    ALKPHOS 72  --   --   --   --   --   --  61  --    BILITOT 0.19*  --   --   --   --   --   --  0.17*  --    POCGLU  --    < > 186* 123* 105 125* 191*  --  100    < > = values in this interval not displayed.      ABG:No results found for: POCPH, PHART, PH, POCPCO2, SED1FCE, PCO2, POCPO2, PO2ART, PO2, POCHCO3, LGJ5CWJ, HCO3, NBEA, PBEA, BEART, BE, THGBART, THB, RFR7YPG, EEVU8RIZ, V5TOABWV, O2SAT, FIO2  Lab Results   Component Value Date/Time    SPECIAL NOT REPORTED 01/27/2015 12:05 PM     Lab Results   Component Value Date/Time    CULTURE NO GROWTH 01/27/2015 12:05 PM    CULTURE  01/27/2015 12:05 PM     Performed at [facility] [id number] [place] [address]

## 2020-07-15 ENCOUNTER — HOSPITAL ENCOUNTER (OUTPATIENT)
Dept: GENERAL RADIOLOGY | Facility: CLINIC | Age: 45
Discharge: HOME | End: 2020-07-17
Payer: MEDICARE

## 2020-07-15 ENCOUNTER — HOSPITAL ENCOUNTER (OUTPATIENT)
Facility: CLINIC | Age: 45
Discharge: HOME | End: 2020-07-17
Payer: MEDICARE

## 2020-07-15 PROCEDURE — 72100 X-RAY EXAM L-S SPINE 2/3 VWS: CPT

## 2020-07-15 PROCEDURE — 73502 X-RAY EXAM HIP UNI 2-3 VIEWS: CPT

## 2020-07-29 RX ORDER — MIDODRINE HYDROCHLORIDE 5 MG/1
TABLET ORAL
Qty: 90 TABLET | Refills: 0 | Status: SHIPPED | OUTPATIENT
Start: 2020-07-29 | End: 2020-09-18

## 2020-07-29 RX ORDER — LEVOTHYROXINE SODIUM 0.05 MG/1
TABLET ORAL
Qty: 30 TABLET | Refills: 0 | Status: SHIPPED | OUTPATIENT
Start: 2020-07-29 | End: 2020-08-24 | Stop reason: SDUPTHER

## 2020-07-31 ENCOUNTER — TELEPHONE (OUTPATIENT)
Dept: FAMILY MEDICINE CLINIC | Age: 45
End: 2020-07-31

## 2020-07-31 NOTE — TELEPHONE ENCOUNTER
Patient called in to Get referral to [facility] re faxed and called and left their office a message to call patient to get scheduled being their office was closed. Patient was also calling in to see if she can get a Stronger Muscle Relaxer because the one she is on is not working. Patient was Advised that Provider was on Vacation and will be back Tuesday but the office will give her a call once we get a response about switching medications from the provider and see what she suggests.

## 2020-08-04 ENCOUNTER — TELEPHONE (OUTPATIENT)
Dept: FAMILY MEDICINE CLINIC | Age: 45
End: 2020-08-04

## 2020-08-04 DIAGNOSIS — G89.29 CHRONIC RIGHT-SIDED LOW BACK PAIN WITH RIGHT-SIDED SCIATICA: Primary | ICD-10-CM

## 2020-08-04 DIAGNOSIS — G89.29 CHRONIC RIGHT HIP PAIN: ICD-10-CM

## 2020-08-04 DIAGNOSIS — M54.41 CHRONIC RIGHT-SIDED LOW BACK PAIN WITH RIGHT-SIDED SCIATICA: Primary | ICD-10-CM

## 2020-08-04 DIAGNOSIS — M25.551 CHRONIC RIGHT HIP PAIN: ICD-10-CM

## 2020-08-06 RX ORDER — TRAMADOL HYDROCHLORIDE 50 MG/1
50 TABLET ORAL EVERY 6 HOURS PRN
Qty: 28 TABLET | Refills: 0 | Status: SHIPPED | OUTPATIENT
Start: 2020-08-06 | End: 2020-08-20 | Stop reason: SDUPTHER

## 2020-08-06 NOTE — TELEPHONE ENCOUNTER
Contract signed by patient. Patient requesting a new ORtho.  New referral to dr. Berenice Berumen signed

## 2020-08-07 NOTE — TELEPHONE ENCOUNTER
Please advise on xrays to complete no orders available. Patient states that she is due to see the Ortho on the 19th.

## 2020-08-10 NOTE — TELEPHONE ENCOUNTER
Please see lumbar xray result note. I asked for flexion and extension xrays back on 7/16 to be completed.

## 2020-08-12 ENCOUNTER — TELEPHONE (OUTPATIENT)
Dept: FAMILY MEDICINE CLINIC | Age: 45
End: 2020-08-12

## 2020-08-13 ENCOUNTER — HOSPITAL ENCOUNTER (OUTPATIENT)
Facility: CLINIC | Age: 45
Discharge: HOME | End: 2020-08-15
Payer: MEDICARE

## 2020-08-13 ENCOUNTER — HOSPITAL ENCOUNTER (OUTPATIENT)
Dept: GENERAL RADIOLOGY | Facility: CLINIC | Age: 45
Discharge: HOME | End: 2020-08-15
Payer: MEDICARE

## 2020-08-13 PROCEDURE — 72120 X-RAY BEND ONLY L-S SPINE: CPT

## 2020-08-19 ENCOUNTER — TELEPHONE (OUTPATIENT)
Dept: FAMILY MEDICINE CLINIC | Age: 45
End: 2020-08-19

## 2020-08-19 NOTE — TELEPHONE ENCOUNTER
Patient is calling stating that she has seen her ortho and they are referring her to ortho surgery for back surgery. She is calling asking for something for her back pain. She states that the office told her to call her PCP for pain medication.     Please advise

## 2020-08-20 RX ORDER — TRAMADOL HYDROCHLORIDE 50 MG/1
50 TABLET ORAL EVERY 6 HOURS PRN
Qty: 90 TABLET | Refills: 0 | Status: SHIPPED | OUTPATIENT
Start: 2020-08-20 | End: 2020-09-19

## 2020-08-20 NOTE — TELEPHONE ENCOUNTER
Patient states that the tramadol did help some but that the pain is getting worse. Patient states that she see the Neurosurgeon on Sept 1st. Patient using Sahil Prater.

## 2020-08-20 NOTE — TELEPHONE ENCOUNTER
Patient warrants longer than 7 day supply of Tramadol due to chronic back pain which she is seeing specialist for and will be having back surgery.

## 2020-08-24 ENCOUNTER — OFFICE VISIT (OUTPATIENT)
Dept: FAMILY MEDICINE CLINIC | Age: 45
End: 2020-08-24
Payer: MEDICARE

## 2020-08-24 VITALS
OXYGEN SATURATION: 93 % | SYSTOLIC BLOOD PRESSURE: 80 MMHG | BODY MASS INDEX: 17.72 KG/M2 | DIASTOLIC BLOOD PRESSURE: 60 MMHG | HEART RATE: 85 BPM | WEIGHT: 100 LBS | HEIGHT: 63 IN

## 2020-08-24 PROBLEM — Z79.4 LONG TERM (CURRENT) USE OF INSULIN (HCC): Status: RESOLVED | Noted: 2017-08-28 | Resolved: 2020-08-24

## 2020-08-24 PROCEDURE — G8427 DOCREV CUR MEDS BY ELIG CLIN: HCPCS | Performed by: NURSE PRACTITIONER

## 2020-08-24 PROCEDURE — 3052F HG A1C>EQUAL 8.0%<EQUAL 9.0%: CPT | Performed by: NURSE PRACTITIONER

## 2020-08-24 PROCEDURE — 99214 OFFICE O/P EST MOD 30 MIN: CPT | Performed by: NURSE PRACTITIONER

## 2020-08-24 PROCEDURE — 4004F PT TOBACCO SCREEN RCVD TLK: CPT | Performed by: NURSE PRACTITIONER

## 2020-08-24 PROCEDURE — 2022F DILAT RTA XM EVC RTNOPTHY: CPT | Performed by: NURSE PRACTITIONER

## 2020-08-24 PROCEDURE — G8419 CALC BMI OUT NRM PARAM NOF/U: HCPCS | Performed by: NURSE PRACTITIONER

## 2020-08-24 RX ORDER — GLIPIZIDE 5 MG/1
5 TABLET ORAL DAILY
Qty: 30 TABLET | Refills: 3 | Status: SHIPPED | OUTPATIENT
Start: 2020-08-24 | End: 2020-12-21

## 2020-08-24 RX ORDER — BACLOFEN 5 MG/1
10 TABLET ORAL 3 TIMES DAILY PRN
Qty: 90 TABLET | Refills: 0 | Status: SHIPPED | OUTPATIENT
Start: 2020-08-24 | End: 2020-09-23

## 2020-08-24 RX ORDER — LEVOTHYROXINE SODIUM 0.05 MG/1
TABLET ORAL
Qty: 90 TABLET | Refills: 3 | Status: SHIPPED | OUTPATIENT
Start: 2020-08-24 | End: 2021-08-24

## 2020-08-24 ASSESSMENT — ENCOUNTER SYMPTOMS
NAUSEA: 0
EYE DISCHARGE: 0
WHEEZING: 0
RHINORRHEA: 0
ABDOMINAL PAIN: 0
COUGH: 0
SORE THROAT: 0
CONSTIPATION: 0
EYE PAIN: 0
SHORTNESS OF BREATH: 0
VOMITING: 0
BACK PAIN: 1
DIARRHEA: 0

## 2020-08-24 NOTE — PROGRESS NOTES
vaccine  Aged Out     BP 80/60 (Site: Left Upper Arm, Position: Sitting, Cuff Size: Small Adult)   Pulse 85   Ht 5' 3\" (1.6 m)   Wt 100 lb (45.4 kg)   SpO2 93%   BMI 17.71 kg/m²      PHQ Scores 6/29/2020 1/22/2019 5/30/2017   PHQ2 Score 0 0 0   PHQ9 Score 0 0 0     Interpretation of Total Score DepressionSeverity: 1-4 = Minimal depression, 5-9 = Mild depression, 10-14 = Moderate depression, 15-19 = Moderately severe depression, 20-27 = Severe depression    Current Outpatient Medications   Medication Sig Dispense Refill    Baclofen (LIORESAL) 5 MG tablet Take 2 tablets by mouth 3 times daily as needed (back pain) 90 tablet 0    levothyroxine (SYNTHROID) 50 MCG tablet TAKE ONE TABLET BY MOUTH DAILY 90 tablet 3    glipiZIDE (GLUCOTROL) 5 MG tablet Take 1 tablet by mouth daily 30 tablet 3    traMADol (ULTRAM) 50 MG tablet Take 1 tablet by mouth every 6 hours as needed for Pain for up to 30 days. Take lowest dose possible to manage pain 90 tablet 0    midodrine (PROAMATINE) 5 MG tablet TAKE ONE TABLET BY MOUTH DAILY *DO NOT GIVE AFTER 6 PM OR WITHIN FOUR HOURS OF BEDTIME* 90 tablet 0    Multiple Vitamins-Minerals (MULTIVITAMIN PO) Take  by mouth. No current facility-administered medications for this visit. Saw Dr. Renée Ruby last week. He referred her to neurosurgeon. Back pain is bad. Told her hip is good but her low back is pinching causing her right hip pains. They wanted her to see neurosurgeon tomorrow but hasn't had MRI done yet. MRI lumbar on 8/27. Seeing neurosurgeon on 9/1. Taking Tramadol 2 tabs every 6 hours. Hard time sleeping and getting comfortable. Medication doesn't help much during day when she is moving around. Did not take midodrine this morning but has been taking it daily. Pain pump alarming in office because her pump needs refilled. Doesn't want to use it anymore. Last saw pain provider in Feb. Not really checking her blood sugars.  Has been drinking a lot of water and paraspinal) and pain. Right lower leg: No edema. Left lower leg: No edema. Skin:     General: Skin is warm and dry. Neurological:      Mental Status: She is alert and oriented to person, place, and time. Psychiatric:         Mood and Affect: Mood normal.         Assessment / Plan:     1. Type 2 diabetes mellitus with hyperglycemia, without long-term current use of insulin ([facility])    Diabetes uncontrolled. Restart Glipizide 5 mg once daily. Need to monitor kidney function. Recommend routine monitoring of blood sugars.   - glipiZIDE (GLUCOTROL) 5 MG tablet; Take 1 tablet by mouth daily  Dispense: 30 tablet; Refill: 3  - Comprehensive Metabolic Panel; Future  - Hemoglobin A1C; Future  - Microalbumin, Ur; Future    2. Chronic right-sided low back pain with right-sided sciatica    Follow up with specialist as planned. Trial Baclofen as needed. - Baclofen (LIORESAL) 5 MG tablet; Take 2 tablets by mouth 3 times daily as needed (back pain)  Dispense: 90 tablet; Refill: 0    3. Alcohol-induced chronic pancreatitis (HCC)    Stable. Not drinking alcohol. No recent flare up. Will monitor. 4. Chronic renal insufficiency, stage III (moderate) ([facility])    Kidney function worsening. Needs to schedule follow up with nephrologist.     5. Hypothyroidism, unspecified type    - levothyroxine (SYNTHROID) 50 MCG tablet; TAKE ONE TABLET BY MOUTH DAILY  Dispense: 90 tablet; Refill: 3      Return in about 3 months (around 11/24/2020), or if symptoms worsen or fail to improve.           Electronically signed by TENZIN Wood CNP on 8/31/2020 at 11:14 AM

## 2020-09-02 ENCOUNTER — HOSPITAL ENCOUNTER (OUTPATIENT)
Dept: MRI IMAGING | Facility: CLINIC | Age: 45
Discharge: HOME | End: 2020-09-04
Payer: MEDICARE

## 2020-09-02 ENCOUNTER — HOSPITAL ENCOUNTER (OUTPATIENT)
Dept: MRI IMAGING | Facility: CLINIC | Age: 45
Discharge: HOME | End: 2020-08-29
Payer: MEDICARE

## 2020-09-02 PROCEDURE — 72148 MRI LUMBAR SPINE W/O DYE: CPT

## 2020-09-11 NOTE — TELEPHONE ENCOUNTER
LOV 8/24/20  LRF 12/7/15  RTO 3 months,     Health Maintenance   Topic Date Due    DTaP/Tdap/Td vaccine (1 - Tdap) 02/15/1992    Flu vaccine (1) 09/01/2020    Hepatitis A vaccine (1 of 2 - Risk 2-dose series) 12/29/2020 (Originally 2/15/1976)    Diabetic foot exam  06/29/2021 (Originally 1/22/2020)    Hepatitis B vaccine (1 of 3 - Risk 3-dose series) 06/29/2021 (Originally 2/15/1992)    Cervical cancer screen  06/29/2021 (Originally 2/15/1994)    Annual Wellness Visit (AWV)  06/29/2021 (Originally 5/29/2019)    Diabetic retinal exam  12/14/2020    A1C test (Diabetic or Prediabetic)  06/29/2021    Diabetic microalbuminuria test  06/29/2021    Lipid screen  06/29/2021    TSH testing  06/29/2021    Potassium monitoring  07/07/2021    Creatinine monitoring  07/07/2021    Pneumococcal 0-64 years Vaccine  Completed    HIV screen  Completed    Hib vaccine  Aged Out    Meningococcal (ACWY) vaccine  Aged Out             (applicable per patient's age: Cancer Screenings, Depression Screening, Fall Risk Screening, Immunizations)    Hemoglobin A1C (%)   Date Value   06/29/2020 8.5 (H)   02/14/2020 6.6 (H)   01/19/2019 6.0     Microalb/Crt.  Ratio (mcg/mg creat)   Date Value   06/29/2020 CANNOT BE CALCULATED     LDL Cholesterol (mg/dL)   Date Value   06/29/2020 106     LDL Calculated (mg/dL)   Date Value   07/06/2016 121   07/06/2016 121     AST (U/L)   Date Value   07/02/2020 12     ALT (U/L)   Date Value   07/02/2020 6     BUN (mg/dL)   Date Value   07/02/2020 30 (H)      (goal A1C is < 7)   (goal LDL is <100) need 30-50% reduction from baseline     BP Readings from Last 3 Encounters:   08/24/20 80/60   07/02/20 135/88   06/29/20 137/68    (goal /80)      All Future Testing planned in [facility]:  Lab Frequency Next Occurrence   Basic Metabolic Panel Once 65/05/4005   Comprehensive Metabolic Panel Once 54/44/3222   Hemoglobin A1C Once 11/22/2020   Microalbumin, Ur Once 11/22/2020       Next Visit Date:  No future appointments.          Patient Active Problem List:     Fatty liver     Neuropathy     Pancreas cyst     Hypothyroidism     Gastroparesis     Alcoholic cirrhosis of liver with ascites (HCC)     Hypotension     Controlled type 2 diabetes mellitus with stage 3 chronic kidney disease, with long-term current use of insulin ([facility] [address]     Encounter for adjustment and management of infusion pump     Hx of artificial opening     Type 2 diabetes mellitus with hyperglycemia (HCC)     Abdominal pain     Alcohol-induced chronic pancreatitis (HCC)     Anemia     Chronic pain     Chronic renal insufficiency, stage III (moderate) (HCC)     Cirrhosis of liver (HCC)     Edema of lower extremity     Hypercalcemia     Indigestion     Left lower quadrant pain     Nausea and vomiting     Retention of urine     Symptoms involving urinary system     Cyst and pseudocyst of pancreas     Type 2 diabetes mellitus without complications (HCC)     Right hip pain     S/P TIPS (transjugular intrahepatic portosystemic shunt)

## 2020-09-14 RX ORDER — BLOOD SUGAR DIAGNOSTIC
1 STRIP MISCELLANEOUS 3 TIMES DAILY
Qty: 100 STRIP | Refills: 2 | Status: SHIPPED | OUTPATIENT
Start: 2020-09-14 | End: 2021-03-25

## 2020-09-16 ENCOUNTER — TELEPHONE (OUTPATIENT)
Dept: FAMILY MEDICINE CLINIC | Age: 45
End: 2020-09-16

## 2020-09-18 RX ORDER — MIDODRINE HYDROCHLORIDE 5 MG/1
5 TABLET ORAL DAILY
Qty: 90 TABLET | Refills: 0 | Status: SHIPPED | OUTPATIENT
Start: 2020-09-18 | End: 2021-09-18

## 2020-09-18 NOTE — TELEPHONE ENCOUNTER
Metabolic Panel Once 66/61/8768   Hemoglobin A1C Once 11/22/2020   Microalbumin, Ur Once 11/22/2020       Next Visit Date:  No future appointments.          Patient Active Problem List:     Fatty liver     Neuropathy     Pancreas cyst     Hypothyroidism     Gastroparesis     Alcoholic cirrhosis of liver with ascites (HCC)     Hypotension     Controlled type 2 diabetes mellitus with stage 3 chronic kidney disease, with long-term current use of insulin ([address]     Encounter for adjustment and management of infusion pump     Hx of artificial opening     Type 2 diabetes mellitus with hyperglycemia (HCC)     Abdominal pain     Alcohol-induced chronic pancreatitis (HCC)     Anemia     Chronic pain     Chronic renal insufficiency, stage III (moderate) (HCC)     Cirrhosis of liver (HCC)     Edema of lower extremity     Hypercalcemia     Indigestion     Left lower quadrant pain     Nausea and vomiting     Retention of urine     Symptoms involving urinary system     Cyst and pseudocyst of pancreas     Type 2 diabetes mellitus without complications (HCC)     Right hip pain     S/P TIPS (transjugular intrahepatic portosystemic shunt)

## 2020-12-04 ENCOUNTER — TELEPHONE (OUTPATIENT)
Dept: FAMILY MEDICINE CLINIC | Age: 45
End: 2020-12-04

## 2020-12-04 DIAGNOSIS — I95.89 OTHER SPECIFIED HYPOTENSION: ICD-10-CM

## 2020-12-21 RX ORDER — GLIPIZIDE 5 MG/1
5 TABLET ORAL
Qty: 90 TABLET | Refills: 1 | Status: SHIPPED | OUTPATIENT
Start: 2020-12-21 | End: 2021-12-21

## 2020-12-21 NOTE — TELEPHONE ENCOUNTER
future appointments.          Patient Active Problem List:     Fatty liver     Neuropathy     Pancreas cyst     Hypothyroidism     Gastroparesis     Alcoholic cirrhosis of liver with ascites (HCC)     Hypotension     Controlled type 2 diabetes mellitus with stage 3 chronic kidney disease, with long-term current use of insulin ([facility] [address]     Encounter for adjustment and management of infusion pump     Hx of artificial opening     Type 2 diabetes mellitus with hyperglycemia (HCC)     Abdominal pain     Alcohol-induced chronic pancreatitis (HCC)     Anemia     Chronic pain     Chronic renal insufficiency, stage III (moderate)     Cirrhosis of liver (HCC)     Edema of lower extremity     Hypercalcemia     Indigestion     Left lower quadrant pain     Nausea and vomiting     Retention of urine     Symptoms involving urinary system     Cyst and pseudocyst of pancreas     Type 2 diabetes mellitus without complications (HCC)     Right hip pain     S/P TIPS (transjugular intrahepatic portosystemic shunt)

## 2021-03-25 DIAGNOSIS — E11.65 TYPE 2 DIABETES MELLITUS WITH HYPERGLYCEMIA, WITHOUT LONG-TERM CURRENT USE OF INSULIN (HCC): ICD-10-CM

## 2021-03-25 RX ORDER — BLOOD SUGAR DIAGNOSTIC
1 STRIP MISCELLANEOUS 3 TIMES DAILY
Qty: 100 STRIP | Refills: 2 | Status: SHIPPED | OUTPATIENT
Start: 2021-03-25 | End: 2022-03-25

## 2021-03-25 NOTE — TELEPHONE ENCOUNTER
Lov: 6/29/20  Lrf: 9/14/20  Na: 0 LVM for patient to schedule   Health Maintenance   Topic Date Due    Hepatitis A vaccine (1 of 2 - Risk 2-dose series) Never done    COVID-19 Vaccine (1) Never done    DTaP/Tdap/Td vaccine (1 - Tdap) Never done    Diabetic retinal exam  12/14/2019    Flu vaccine (1) 09/01/2020    Diabetic foot exam  06/29/2021 (Originally 1/22/2020)    Hepatitis B vaccine (1 of 3 - Risk 3-dose series) 06/29/2021 (Originally 2/15/1992)    Cervical cancer screen  06/29/2021 (Originally 2/15/1994)    Annual Wellness Visit (AWV)  06/29/2021 (Originally 5/29/2019)    A1C test (Diabetic or Prediabetic)  06/29/2021    Diabetic microalbuminuria test  06/29/2021    Lipid screen  06/29/2021    TSH testing  06/29/2021    Potassium monitoring  07/07/2021    Creatinine monitoring  07/07/2021    Pneumococcal 0-64 years Vaccine  Completed    Hepatitis C screen  Completed    HIV screen  Completed    Hib vaccine  Aged Out    Meningococcal (ACWY) vaccine  Aged Out             (applicable per patient's age: Cancer Screenings, Depression Screening, Fall Risk Screening, Immunizations)    Hemoglobin A1C (%)   Date Value   06/29/2020 8.5 (H)   02/14/2020 6.6 (H)   01/19/2019 6.0     Microalb/Crt.  Ratio (mcg/mg creat)   Date Value   06/29/2020 CANNOT BE CALCULATED     LDL Cholesterol (mg/dL)   Date Value   06/29/2020 106     LDL Calculated (mg/dL)   Date Value   07/06/2016 121   07/06/2016 121     AST (U/L)   Date Value   07/02/2020 12     ALT (U/L)   Date Value   07/02/2020 6     BUN (mg/dL)   Date Value   07/02/2020 30 (H)      (goal A1C is < 7)   (goal LDL is <100) need 30-50% reduction from baseline     BP Readings from Last 3 Encounters:   08/24/20 80/60   07/02/20 135/88   06/29/20 137/68    (goal /80)      All Future Testing planned in [facility]:  Lab Frequency Next Occurrence   Basic Metabolic Panel Once 38/98/3770   Comprehensive Metabolic Panel Once 65/75/4508   Hemoglobin A1C Once 11/22/2020   Microalbumin, Ur Once 11/22/2020       Next Visit Date:  No future appointments.          Patient Active Problem List:     Fatty liver     Neuropathy     Pancreas cyst     Hypothyroidism     Gastroparesis     Alcoholic cirrhosis of liver with ascites (HCC)     Hypotension     Controlled type 2 diabetes mellitus with stage 3 chronic kidney disease, with long-term current use of insulin ([facility] [address]     Encounter for adjustment and management of infusion pump     Hx of artificial opening     Type 2 diabetes mellitus with hyperglycemia (HCC)     Abdominal pain     Alcohol-induced chronic pancreatitis (HCC)     Anemia     Chronic pain     Chronic renal insufficiency, stage III (moderate)     Cirrhosis of liver (HCC)     Edema of lower extremity     Hypercalcemia     Indigestion     Left lower quadrant pain     Nausea and vomiting     Retention of urine     Symptoms involving urinary system     Cyst and pseudocyst of pancreas     Type 2 diabetes mellitus without complications (HCC)     Right hip pain     S/P TIPS (transjugular intrahepatic portosystemic shunt)

## 2022-09-11 DIAGNOSIS — E11.65 TYPE 2 DIABETES MELLITUS WITH HYPERGLYCEMIA, WITHOUT LONG-TERM CURRENT USE OF INSULIN (HCC): ICD-10-CM

## 2022-09-16 RX ORDER — BLOOD SUGAR DIAGNOSTIC
STRIP MISCELLANEOUS
Qty: 100 STRIP | Refills: 2 | OUTPATIENT
Start: 2022-09-16